# Patient Record
Sex: FEMALE | Race: OTHER | NOT HISPANIC OR LATINO
[De-identification: names, ages, dates, MRNs, and addresses within clinical notes are randomized per-mention and may not be internally consistent; named-entity substitution may affect disease eponyms.]

---

## 2018-11-19 PROBLEM — Z00.00 ENCOUNTER FOR PREVENTIVE HEALTH EXAMINATION: Status: ACTIVE | Noted: 2018-11-19

## 2018-12-11 ENCOUNTER — APPOINTMENT (OUTPATIENT)
Dept: OBGYN | Facility: CLINIC | Age: 43
End: 2018-12-11

## 2018-12-12 ENCOUNTER — APPOINTMENT (OUTPATIENT)
Dept: OBGYN | Facility: CLINIC | Age: 43
End: 2018-12-12

## 2019-02-22 ENCOUNTER — APPOINTMENT (OUTPATIENT)
Dept: OBGYN | Facility: CLINIC | Age: 44
End: 2019-02-22

## 2019-03-14 ENCOUNTER — APPOINTMENT (OUTPATIENT)
Dept: OBGYN | Facility: CLINIC | Age: 44
End: 2019-03-14
Payer: MEDICAID

## 2019-03-14 VITALS
SYSTOLIC BLOOD PRESSURE: 128 MMHG | DIASTOLIC BLOOD PRESSURE: 82 MMHG | WEIGHT: 236 LBS | BODY MASS INDEX: 43.43 KG/M2 | HEIGHT: 62 IN

## 2019-03-14 DIAGNOSIS — Z86.39 PERSONAL HISTORY OF OTHER ENDOCRINE, NUTRITIONAL AND METABOLIC DISEASE: ICD-10-CM

## 2019-03-14 PROCEDURE — 99386 PREV VISIT NEW AGE 40-64: CPT

## 2019-03-14 NOTE — CHIEF COMPLAINT
[Initial Visit] : initial GYN visit [FreeTextEntry1] : A 43 years pt. is present for a breast exam pt. needs script for mammo and pelvic sono. Kelli was in the room during exam.

## 2019-03-14 NOTE — HISTORY OF PRESENT ILLNESS
[Good] : being in good health [Healthy Diet] : a healthy diet [Reproductive Age] : is of reproductive age [Definite:  ___ (Date)] : the last menstrual period was [unfilled] [Spotting Between  Menses] : no spotting between menses [Regular Cycle Intervals] : periods have been regular [Menarche Age: ____] : age at menarche was [unfilled] [Sexually Active] : is sexually active [Male ___] : [unfilled] male

## 2019-03-14 NOTE — PHYSICAL EXAM
[Awake] : awake [Alert] : alert [Soft] : soft [Oriented x3] : oriented to person, place, and time [Normal] : uterus [Labia Majora] : labia major [No Bleeding] : there was no active vaginal bleeding [Pap Obtained] : a Pap smear was performed [Anteversion] : anteverted [Uterine Adnexae] : were not tender and not enlarged [No Tenderness] : no rectal tenderness [RRR, No Murmurs] : RRR, no murmurs [CTAB] : CTAB [Acute Distress] : no acute distress [LAD] : no lymphadenopathy [Thyroid Nodule] : no thyroid nodule [Goiter] : no goiter [Mass] : no breast mass [Nipple Discharge] : no nipple discharge [Axillary LAD] : no axillary lymphadenopathy [Tender] : non tender [Distended] : not distended [H/Smegaly] : no hepatosplenomegaly [Depressed Mood] : not depressed [Flat Affect] : affect not flat [Discharge] : had no discharge [Motion Tenderness] : there was no cervical motion tenderness [Tenderness] : nontender [Enlarged ___ wks] : not enlarged [Adnexa Tenderness] : were not tender [Ovarian Mass (___ Cm)] : there were no adnexal masses [FreeTextEntry7] : exam limited secondary to patient's morbid obesity

## 2019-03-15 LAB — HPV HIGH+LOW RISK DNA PNL CVX: NOT DETECTED

## 2019-03-19 LAB — CYTOLOGY CVX/VAG DOC THIN PREP: NORMAL

## 2019-03-22 ENCOUNTER — APPOINTMENT (OUTPATIENT)
Age: 44
End: 2019-03-22
Payer: MEDICAID

## 2019-03-22 ENCOUNTER — ASOB RESULT (OUTPATIENT)
Age: 44
End: 2019-03-22

## 2019-03-22 PROCEDURE — 76857 US EXAM PELVIC LIMITED: CPT | Mod: 59

## 2019-03-22 PROCEDURE — 76830 TRANSVAGINAL US NON-OB: CPT

## 2019-05-30 ENCOUNTER — APPOINTMENT (OUTPATIENT)
Dept: MAMMOGRAPHY | Facility: CLINIC | Age: 44
End: 2019-05-30

## 2019-05-30 PROCEDURE — 77067 SCR MAMMO BI INCL CAD: CPT

## 2019-05-30 PROCEDURE — 77063 BREAST TOMOSYNTHESIS BI: CPT

## 2019-10-03 ENCOUNTER — APPOINTMENT (OUTPATIENT)
Dept: OBGYN | Facility: CLINIC | Age: 44
End: 2019-10-03
Payer: MEDICAID

## 2019-10-03 VITALS
SYSTOLIC BLOOD PRESSURE: 121 MMHG | HEIGHT: 62 IN | WEIGHT: 207 LBS | BODY MASS INDEX: 38.09 KG/M2 | DIASTOLIC BLOOD PRESSURE: 78 MMHG

## 2019-10-03 DIAGNOSIS — N92.0 EXCESSIVE AND FREQUENT MENSTRUATION WITH REGULAR CYCLE: ICD-10-CM

## 2019-10-03 PROCEDURE — 99213 OFFICE O/P EST LOW 20 MIN: CPT

## 2019-10-03 NOTE — CHIEF COMPLAINT
[Initial Visit] : initial GYN visit [FreeTextEntry1] : A 43 years old pt. is present for a consult on birth control methods. LMP was 10/01/2019. Pt. declined MOA in the room.

## 2019-12-01 ENCOUNTER — INPATIENT (INPATIENT)
Facility: HOSPITAL | Age: 44
LOS: 21 days | Discharge: HOME | DRG: 885 | End: 2019-12-23
Attending: PSYCHIATRY & NEUROLOGY | Admitting: PSYCHIATRY & NEUROLOGY
Payer: COMMERCIAL

## 2019-12-01 ENCOUNTER — EMERGENCY (EMERGENCY)
Facility: HOSPITAL | Age: 44
LOS: 1 days | End: 2019-12-01
Admitting: EMERGENCY MEDICINE
Payer: MEDICAID

## 2019-12-01 PROCEDURE — 99285 EMERGENCY DEPT VISIT HI MDM: CPT

## 2019-12-01 PROCEDURE — 71046 X-RAY EXAM CHEST 2 VIEWS: CPT | Mod: 26

## 2019-12-01 PROCEDURE — 90792 PSYCH DIAG EVAL W/MED SRVCS: CPT | Mod: GT

## 2019-12-01 RX ORDER — FLUPHENAZINE HYDROCHLORIDE 1 MG/1
3.75 TABLET, FILM COATED ORAL AT BEDTIME
Refills: 0 | Status: DISCONTINUED | OUTPATIENT
Start: 2019-12-02 | End: 2019-12-02

## 2019-12-01 RX ORDER — METFORMIN HYDROCHLORIDE 850 MG/1
1000 TABLET ORAL DAILY
Refills: 0 | Status: DISCONTINUED | OUTPATIENT
Start: 2019-12-02 | End: 2019-12-23

## 2019-12-01 RX ORDER — ACETAMINOPHEN 500 MG
325 TABLET ORAL EVERY 4 HOURS
Refills: 0 | Status: DISCONTINUED | OUTPATIENT
Start: 2019-12-02 | End: 2019-12-23

## 2019-12-01 RX ORDER — TRAZODONE HCL 50 MG
50 TABLET ORAL AT BEDTIME
Refills: 0 | Status: DISCONTINUED | OUTPATIENT
Start: 2019-12-02 | End: 2019-12-23

## 2019-12-01 RX ORDER — FLUPHENAZINE HYDROCHLORIDE 1 MG/1
1 TABLET, FILM COATED ORAL EVERY 6 HOURS
Refills: 0 | Status: DISCONTINUED | OUTPATIENT
Start: 2019-12-02 | End: 2019-12-04

## 2019-12-01 RX ORDER — LISINOPRIL 2.5 MG/1
2.5 TABLET ORAL DAILY
Refills: 0 | Status: DISCONTINUED | OUTPATIENT
Start: 2019-12-02 | End: 2019-12-23

## 2019-12-02 VITALS
HEIGHT: 62 IN | RESPIRATION RATE: 18 BRPM | WEIGHT: 199.96 LBS | HEART RATE: 71 BPM | TEMPERATURE: 98 F | SYSTOLIC BLOOD PRESSURE: 125 MMHG | OXYGEN SATURATION: 98 % | DIASTOLIC BLOOD PRESSURE: 82 MMHG

## 2019-12-02 DIAGNOSIS — F25.0 SCHIZOAFFECTIVE DISORDER, BIPOLAR TYPE: ICD-10-CM

## 2019-12-02 DIAGNOSIS — F20.0 PARANOID SCHIZOPHRENIA: ICD-10-CM

## 2019-12-02 LAB
ALBUMIN SERPL ELPH-MCNC: 4.1 G/DL — SIGNIFICANT CHANGE UP (ref 3.3–5)
ALP SERPL-CCNC: 67 U/L — SIGNIFICANT CHANGE UP (ref 40–120)
ALT FLD-CCNC: <5 U/L — LOW (ref 10–45)
ANION GAP SERPL CALC-SCNC: 12 MMOL/L — SIGNIFICANT CHANGE UP (ref 5–17)
AST SERPL-CCNC: 10 U/L — SIGNIFICANT CHANGE UP (ref 10–40)
BILIRUB SERPL-MCNC: <0.2 MG/DL — SIGNIFICANT CHANGE UP (ref 0.2–1.2)
BUN SERPL-MCNC: 13 MG/DL — SIGNIFICANT CHANGE UP (ref 7–23)
CALCIUM SERPL-MCNC: 9.4 MG/DL — SIGNIFICANT CHANGE UP (ref 8.4–10.5)
CHLORIDE SERPL-SCNC: 105 MMOL/L — SIGNIFICANT CHANGE UP (ref 96–108)
CO2 SERPL-SCNC: 26 MMOL/L — SIGNIFICANT CHANGE UP (ref 22–31)
CREAT SERPL-MCNC: 0.65 MG/DL — SIGNIFICANT CHANGE UP (ref 0.5–1.3)
GLUCOSE SERPL-MCNC: 210 MG/DL — HIGH (ref 70–99)
HCG UR QL: NEGATIVE — SIGNIFICANT CHANGE UP
HCT VFR BLD CALC: 40.9 % — SIGNIFICANT CHANGE UP (ref 34.5–45)
HGB BLD-MCNC: 12.7 G/DL — SIGNIFICANT CHANGE UP (ref 11.5–15.5)
MCHC RBC-ENTMCNC: 25.8 PG — LOW (ref 27–34)
MCHC RBC-ENTMCNC: 31.1 GM/DL — LOW (ref 32–36)
MCV RBC AUTO: 83 FL — SIGNIFICANT CHANGE UP (ref 80–100)
NRBC # BLD: 0 /100 WBCS — SIGNIFICANT CHANGE UP (ref 0–0)
PLATELET # BLD AUTO: 275 K/UL — SIGNIFICANT CHANGE UP (ref 150–400)
POTASSIUM SERPL-MCNC: 4.5 MMOL/L — SIGNIFICANT CHANGE UP (ref 3.5–5.3)
POTASSIUM SERPL-SCNC: 4.5 MMOL/L — SIGNIFICANT CHANGE UP (ref 3.5–5.3)
PROT SERPL-MCNC: 7.1 G/DL — SIGNIFICANT CHANGE UP (ref 6–8.3)
RBC # BLD: 4.93 M/UL — SIGNIFICANT CHANGE UP (ref 3.8–5.2)
RBC # FLD: 14.9 % — HIGH (ref 10.3–14.5)
SODIUM SERPL-SCNC: 143 MMOL/L — SIGNIFICANT CHANGE UP (ref 135–145)
WBC # BLD: 13.18 K/UL — HIGH (ref 3.8–10.5)
WBC # FLD AUTO: 13.18 K/UL — HIGH (ref 3.8–10.5)

## 2019-12-02 PROCEDURE — 99233 SBSQ HOSP IP/OBS HIGH 50: CPT

## 2019-12-02 RX ORDER — BENZTROPINE MESYLATE 1 MG
0.5 TABLET ORAL DAILY
Refills: 0 | Status: DISCONTINUED | OUTPATIENT
Start: 2019-12-02 | End: 2019-12-23

## 2019-12-02 RX ORDER — HALOPERIDOL DECANOATE 100 MG/ML
5 INJECTION INTRAMUSCULAR EVERY 6 HOURS
Refills: 0 | Status: DISCONTINUED | OUTPATIENT
Start: 2019-12-02 | End: 2019-12-23

## 2019-12-02 RX ORDER — PANTOPRAZOLE SODIUM 20 MG/1
40 TABLET, DELAYED RELEASE ORAL
Refills: 0 | Status: DISCONTINUED | OUTPATIENT
Start: 2019-12-02 | End: 2019-12-23

## 2019-12-02 RX ORDER — FLUPHENAZINE HYDROCHLORIDE 1 MG/1
5 TABLET, FILM COATED ORAL AT BEDTIME
Refills: 0 | Status: DISCONTINUED | OUTPATIENT
Start: 2019-12-02 | End: 2019-12-06

## 2019-12-02 RX ORDER — POLYETHYLENE GLYCOL 3350 17 G/17G
17 POWDER, FOR SOLUTION ORAL AT BEDTIME
Refills: 0 | Status: DISCONTINUED | OUTPATIENT
Start: 2019-12-02 | End: 2019-12-23

## 2019-12-02 RX ADMIN — Medication 50 MILLIGRAM(S): at 00:20

## 2019-12-02 RX ADMIN — LISINOPRIL 2.5 MILLIGRAM(S): 2.5 TABLET ORAL at 10:51

## 2019-12-02 RX ADMIN — METFORMIN HYDROCHLORIDE 1000 MILLIGRAM(S): 850 TABLET ORAL at 10:50

## 2019-12-02 NOTE — BEHAVIORAL HEALTH ASSESSMENT NOTE - NSBHADMITIPSTRENGTH_PSY_A_CORE
Involved in cultural/spiritual/Mandaeism/community activities/In good physical health/Knowledge of medications/Awareness of substance use issues
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
## 2019-12-02 NOTE — BEHAVIORAL HEALTH ASSESSMENT NOTE - SUICIDE PROTECTIVE FACTORS
Supportive social network of family or friends/Engaged in work or school/Cultural, spiritual and/or moral attitudes against suicide/Identifies reasons for living/Has future plans

## 2019-12-02 NOTE — BEHAVIORAL HEALTH ASSESSMENT NOTE - RISK ASSESSMENT
Low Acute Suicide Risk Static factors  Age, Sex, H/O schizophrenia, family structure, genetic loading  Modifiable factors  T/T non-compliance, acute presentation, mood episode, probable modifiable delusional system  Postive factors  - Supportive family, engaged in work, Druze belief, future oriented, no access to guns, internal resilience,    Presently the patient is at low/minimal risk of suicide and hence would be observed in the hospital for the emergence of any suicidal intent/ideas/thoughts.
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
## 2019-12-02 NOTE — BEHAVIORAL HEALTH ASSESSMENT NOTE - NSBHCHARTREVIEWVS_PSY_A_CORE FT
Vital Signs Last 24 Hrs  T(C): 36.9 (02 Dec 2019 10:15), Max: 36.9 (02 Dec 2019 10:15)  T(F): 98.5 (02 Dec 2019 10:15), Max: 98.5 (02 Dec 2019 10:15)  HR: 80 (02 Dec 2019 10:15) (71 - 80)  BP: 128/86 (02 Dec 2019 10:15) (125/82 - 128/86)  BP(mean): --  RR: 20 (02 Dec 2019 10:15) (18 - 20)  SpO2: 96% (02 Dec 2019 10:15) (96% - 98%)

## 2019-12-02 NOTE — BEHAVIORAL HEALTH ASSESSMENT NOTE - VIOLENCE RISK FACTORS:
Violent ideation/threat/speech/Feeling of being under threat and being unable to control threat/Impulsivity/Irritability

## 2019-12-02 NOTE — BEHAVIORAL HEALTH ASSESSMENT NOTE - SUICIDE RISK FACTORS
Family History of psychiatric diagnoses requiring hospitalization/Insomnia/Current mood episode/Impulsivity

## 2019-12-02 NOTE — BEHAVIORAL HEALTH ASSESSMENT NOTE - CASE SUMMARY
;;12/2:; sitting in bed; irritable; generally oriented; denies need for medications; discounts concerns about her paranoid thinking; mostly protests being in the hosptial;  aware that Prolixin will be started at 5mg; that 2.5mg daily is a homeopathic dose;  will try to obtain more history and anticipate need for TOO if patient continues to demonstrate irritability and demonstrates paranoid thinking.  ...  ;;12/2:; sitting in bed; irritable; generally oriented; denies need for medications; discounts concerns about her paranoid thinking; mostly protests being in the hosptial;  aware that Prolixin will be started at 5mg; that 2.5mg daily is a homeopathic dose;  will try to obtain more history and anticipate need for TOO if patient continues to demonstrate irritability and demonstrates paranoid thinking.
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
## 2019-12-03 LAB — HBA1C BLD-MCNC: 6.3 % — HIGH (ref 4–5.6)

## 2019-12-03 PROCEDURE — 99232 SBSQ HOSP IP/OBS MODERATE 35: CPT

## 2019-12-03 RX ADMIN — METFORMIN HYDROCHLORIDE 1000 MILLIGRAM(S): 850 TABLET ORAL at 10:50

## 2019-12-03 RX ADMIN — FLUPHENAZINE HYDROCHLORIDE 1 MILLIGRAM(S): 1 TABLET, FILM COATED ORAL at 10:50

## 2019-12-03 RX ADMIN — LISINOPRIL 2.5 MILLIGRAM(S): 2.5 TABLET ORAL at 10:50

## 2019-12-03 RX ADMIN — FLUPHENAZINE HYDROCHLORIDE 5 MILLIGRAM(S): 1 TABLET, FILM COATED ORAL at 21:58
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
## 2019-12-03 NOTE — PROGRESS NOTE BEHAVIORAL HEALTH - RISK ASSESSMENT
Static factors include age, sex, H/O schizophrenia, family structure, family history/genetic    Modifiable factors: T/T non-compliance, acute presentation, mood episode, probable modifiable delusional system    Protective factors include supportive family, engaged in work, Jew beliefs, future oriented, no access to guns, internal resilience
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
## 2019-12-03 NOTE — PROGRESS NOTE BEHAVIORAL HEALTH - NSBHCHARTREVIEWVS_PSY_A_CORE FT
Vital Signs Last 24 Hrs  T(C): 36.9 (02 Dec 2019 10:15), Max: 36.9 (02 Dec 2019 10:15)  T(F): 98.5 (02 Dec 2019 10:15), Max: 98.5 (02 Dec 2019 10:15)  HR: 80 (02 Dec 2019 10:15) (80 - 80)  BP: 128/86 (02 Dec 2019 10:15) (128/86 - 128/86)  BP(mean): --  RR: 20 (02 Dec 2019 10:15) (20 - 20)  SpO2: 96% (02 Dec 2019 10:15) (96% - 96%)

## 2019-12-03 NOTE — PROGRESS NOTE BEHAVIORAL HEALTH - RISK ASSESSMENT
Static factors include age, sex, H/O schizophrenia, family structure, family history/genetic    Modifiable factors: T/T non-compliance, acute presentation, mood episode, probable modifiable delusional system    Protective factors include supportive family, engaged in work, Taoism beliefs, future oriented, no access to guns, internal resilience,

## 2019-12-03 NOTE — PROGRESS NOTE BEHAVIORAL HEALTH - NSBHFUPINTERVALHXFT_PSY_A_CORE
Interval history limited due to patient refusal to speak with providers. Patient says she is upset because she told her doctors to call her father several times and has not heard from him yet. States that she tried to call her father yesterday but couldn't because phone did not have a dial tone. Patient was informed that team did speak with her father yesterday and that she must dial "9-1" before phone number to make a call. States she did not have any issues sleeping last night. Otherwise denies suicidal ideation and plan. States she could never kill herself because it is forbidden in Yarsanism and Santeria religions that she practices.    Patient seen later by resident, continues to be hypersexual, with labile mood though is more receptive to discuss initiation of antipsychotic medication. Psychoeducation conducted. She continues to deny SI/HI/plan/intent, and appears to be responding to internal stimuli.
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
## 2019-12-03 NOTE — PROGRESS NOTE BEHAVIORAL HEALTH - NSBHFUPINTERVALHXFT_PSY_A_CORE
Interval history limited due to patient refusal to speak with providers. Patient says she is upset because she told her doctors to call her father several times and has not heard from him yet. States that she tried to call her father yesterday but couldn't because phone did not have a dial tone. Patient was informed that team did speak with her father yesterday and that she must dial "9-1" before phone number to make a call. States she did not have any issues sleeping last night. Otherwise denies suicidal ideation and plan. States she could never kill herself because it is forbidden in Catholic and Santeria religions that she practices.

## 2019-12-04 PROCEDURE — 99232 SBSQ HOSP IP/OBS MODERATE 35: CPT

## 2019-12-04 RX ADMIN — FLUPHENAZINE HYDROCHLORIDE 5 MILLIGRAM(S): 1 TABLET, FILM COATED ORAL at 22:26

## 2019-12-04 RX ADMIN — LISINOPRIL 2.5 MILLIGRAM(S): 2.5 TABLET ORAL at 11:19

## 2019-12-04 RX ADMIN — PANTOPRAZOLE SODIUM 40 MILLIGRAM(S): 20 TABLET, DELAYED RELEASE ORAL at 07:05

## 2019-12-04 RX ADMIN — METFORMIN HYDROCHLORIDE 1000 MILLIGRAM(S): 850 TABLET ORAL at 11:18

## 2019-12-04 NOTE — PROGRESS NOTE BEHAVIORAL HEALTH - RISK ASSESSMENT
Static factors include age, sex, H/O schizophrenia, family structure, family history/genetic    Modifiable factors: T/T non-compliance, acute presentation, mood episode, probable modifiable delusional system    Protective factors include supportive family, engaged in work, Gnosticism beliefs, future oriented, no access to guns, internal resilience, Static factors include age, sex, H/O schizophrenia, family structure, family history/genetic    Modifiable factors: T/T non-compliance, acute presentation, mood episode, probable modifiable delusional system    Protective factors include supportive family, engaged in work, Yarsani beliefs, future oriented, no access to guns, internal resilience,    Presently team is going to focus on the patient's inpatient stay and try to engage the patient in milieu/group therapy, and to upgrade on the medication dosage (Prolixin),

## 2019-12-04 NOTE — PROGRESS NOTE BEHAVIORAL HEALTH - NSBHCHARTREVIEWVS_PSY_A_CORE FT
Vital Signs Last 24 Hrs  T(C): 36.9 (03 Dec 2019 16:20), Max: 36.9 (03 Dec 2019 16:20)  T(F): 98.5 (03 Dec 2019 16:20), Max: 98.5 (03 Dec 2019 16:20)  HR: 100 (03 Dec 2019 16:20) (91 - 100)  BP: 145/83 (03 Dec 2019 16:20) (139/89 - 145/83)  BP(mean): --  RR: 19 (03 Dec 2019 16:20) (19 - 20)  SpO2: 96% (03 Dec 2019 16:20) (96% - 97%) ICU Vital Signs Last 24 Hrs  T(C): 37.2 (04 Dec 2019 09:00), Max: 37.2 (04 Dec 2019 09:00)  T(F): 99 (04 Dec 2019 09:00), Max: 99 (04 Dec 2019 09:00)  HR: 106 (04 Dec 2019 09:00) (100 - 106)  BP: 123/84 (04 Dec 2019 09:00) (123/84 - 145/83)  BP(mean): --  ABP: --  ABP(mean): --  RR: 16 (04 Dec 2019 09:00) (16 - 19)  SpO2: 93% (04 Dec 2019 09:00) (93% - 96%) Vital Signs Last 24 Hrs  T(C): 37.2 (04 Dec 2019 09:00), Max: 37.2 (04 Dec 2019 09:00)  T(F): 99 (04 Dec 2019 09:00), Max: 99 (04 Dec 2019 09:00)  HR: 106 (04 Dec 2019 09:00) (100 - 106)  BP: 123/84 (04 Dec 2019 09:00) (123/84 - 145/83)  BP(mean): --  RR: 16 (04 Dec 2019 09:00) (16 - 19)  SpO2: 93% (04 Dec 2019 09:00) (93% - 96%)
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
## 2019-12-04 NOTE — PROGRESS NOTE BEHAVIORAL HEALTH - NSBHCHARTREVIEWLAB_PSY_A_CORE FT
Hemoglobin A1C, Whole Blood in AM (12.03.19 @ 07:49)    Hemoglobin A1C, Whole Blood: 6.3: Reference Range                 4.0-5.6%       High risk (prediabetic)        5.7-6.4%       Diabetic, diagnostic             >=6.5%       ADA diabetic treatment goal       <7.0%  Reference ranges are based upon the 2010 recommendations of the American  Diabetes Association.  Interpretation may vary for children and  adolescents. %

## 2019-12-04 NOTE — PROGRESS NOTE BEHAVIORAL HEALTH - NSBHFUPINTERVALHXFT_PSY_A_CORE
Pt seen at bedside and discussed with interdisciplinary team. Patient appeared to be internally preoccupied and remains oddly related. Pt agreed last night to take medications and received 1 dose Pt seen at bedside and discussed with interdisciplinary team. Patient appeared to be internally preoccupied and remains oddly related. Pt agreed to take medications and received 5mg Prolixin at 21:00 last night. Yesterday, patient replied "Addie" when asked her full name. Today when asked her full name, patient replied "Katrin Rodríguez". Pt with continued Capgras delusions and refers to her mother as "Phuong". She became agitated when discussing her mother and says she does not know if "Phuong" is still at her house but says "they'll get rid of her". Otherwise, patient denies suicidal ideation, intent, and plan, thoughts of hurting others, hearing voices, and visual hallucinations. Pt is goal-oriented in going home to see her sons and says she will take her medications at home but does not understand why she is being kept on 8 uris. Pt seen at bedside and discussed with interdisciplinary team. Patient appeared to be internally preoccupied and remains oddly related. Pt agreed to take medications and received 5mg Prolixin at 21:00 last night. Yesterday, patient replied "Addie" when asked her full name. Today when asked her full name, patient replied "Katrin Rodríguez". Pt with continued Capgras delusion and refers to her mother as "Phuong". She became agitated when discussing her mother and says she does not know if "Phuong" is still at her house but says "they'll get rid of her". Otherwise, patient denies suicidal ideation, intent, and plan, thoughts of hurting others, hearing voices, and visual hallucinations. Pt is goal-oriented in going home to see her sons and says she will take her medications at home but does not understand why she is being kept on 8 uris.

## 2019-12-05 PROCEDURE — 99232 SBSQ HOSP IP/OBS MODERATE 35: CPT

## 2019-12-05 RX ADMIN — Medication 50 MILLIGRAM(S): at 02:45

## 2019-12-05 RX ADMIN — FLUPHENAZINE HYDROCHLORIDE 5 MILLIGRAM(S): 1 TABLET, FILM COATED ORAL at 21:40

## 2019-12-05 RX ADMIN — PANTOPRAZOLE SODIUM 40 MILLIGRAM(S): 20 TABLET, DELAYED RELEASE ORAL at 06:29

## 2019-12-05 RX ADMIN — LISINOPRIL 2.5 MILLIGRAM(S): 2.5 TABLET ORAL at 09:57

## 2019-12-05 RX ADMIN — METFORMIN HYDROCHLORIDE 1000 MILLIGRAM(S): 850 TABLET ORAL at 09:57
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
## 2019-12-05 NOTE — PROGRESS NOTE BEHAVIORAL HEALTH - NSBHCHARTREVIEWVS_PSY_A_CORE FT
Vital Signs Last 24 Hrs  T(C): 37.1 (04 Dec 2019 17:00), Max: 37.2 (04 Dec 2019 09:00)  T(F): 98.7 (04 Dec 2019 17:00), Max: 99 (04 Dec 2019 09:00)  HR: 100 (04 Dec 2019 17:00) (100 - 106)  BP: 112/80 (04 Dec 2019 17:00) (112/80 - 123/84)  BP(mean): --  RR: 20 (04 Dec 2019 17:00) (16 - 20)  SpO2: 96% (04 Dec 2019 17:00) (93% - 96%) Vital Signs Last 24 Hrs  T(C): 36.9 (05 Dec 2019 08:00), Max: 37.1 (04 Dec 2019 17:00)  T(F): 98.5 (05 Dec 2019 08:00), Max: 98.7 (04 Dec 2019 17:00)  HR: 105 (05 Dec 2019 08:00) (100 - 105)  BP: 115/77 (05 Dec 2019 08:00) (112/80 - 115/77)  BP(mean): --  RR: 18 (05 Dec 2019 08:00) (18 - 20)  SpO2: 97% (05 Dec 2019 08:00) (96% - 97%)

## 2019-12-05 NOTE — PROGRESS NOTE BEHAVIORAL HEALTH - RISK ASSESSMENT
Static factors include age, sex, H/O schizophrenia, family structure, family history/genetic    Modifiable factors: T/T non-compliance, acute presentation, mood episode, probable modifiable delusional system    Protective factors include supportive family, engaged in work, Evangelical beliefs, future oriented, no access to guns, internal resilience,    Presently team is going to focus on the patient's inpatient stay and try to engage the patient in milieu/group therapy, and to upgrade on the medication dosage (Prolixin),

## 2019-12-05 NOTE — PROGRESS NOTE BEHAVIORAL HEALTH - NSBHFUPINTERVALHXFT_PSY_A_CORE
Pt seen at bedside and discussed with interdisciplinary team. Patient continues to appear internally preoccupied but with improved relatedness. She displays a cheerful affect and is markedly less irritable than previously noted. She continues to say that she feels "fine" and denies side effects of medication. Per nursing, patient did not go to sleep last night. When asked, patient states that she sleeps just fine and that she used to work 6a-10p "on the outside" and did not get much sleep. Otherwise, patient denies suicidal ideation, intent, and plan, thoughts of hurting others, hearing voices, and visual hallucinations. Pt is goal-oriented in going home to see her sons and says she is looking forward to her daughter visiting.

## 2019-12-06 PROCEDURE — 99232 SBSQ HOSP IP/OBS MODERATE 35: CPT

## 2019-12-06 RX ORDER — FLUPHENAZINE HYDROCHLORIDE 1 MG/1
10 TABLET, FILM COATED ORAL AT BEDTIME
Refills: 0 | Status: DISCONTINUED | OUTPATIENT
Start: 2019-12-06 | End: 2019-12-09

## 2019-12-06 RX ADMIN — PANTOPRAZOLE SODIUM 40 MILLIGRAM(S): 20 TABLET, DELAYED RELEASE ORAL at 07:00

## 2019-12-06 RX ADMIN — LISINOPRIL 2.5 MILLIGRAM(S): 2.5 TABLET ORAL at 09:55

## 2019-12-06 RX ADMIN — METFORMIN HYDROCHLORIDE 1000 MILLIGRAM(S): 850 TABLET ORAL at 09:55

## 2019-12-06 RX ADMIN — FLUPHENAZINE HYDROCHLORIDE 10 MILLIGRAM(S): 1 TABLET, FILM COATED ORAL at 22:24

## 2019-12-06 NOTE — PROGRESS NOTE BEHAVIORAL HEALTH - RISK ASSESSMENT
Static factors include age, sex, H/O schizophrenia, family structure, family history/genetic    Modifiable factors: T/T non-compliance, acute presentation, mood episode, probable modifiable delusional system    Protective factors include supportive family, engaged in work, Mandaen beliefs, future oriented, no access to guns, internal resilience,    Presently team is going to focus on the patient's inpatient stay and try to engage the patient in milieu/group therapy, and to upgrade on the medication dosage (Prolixin),

## 2019-12-06 NOTE — PROGRESS NOTE BEHAVIORAL HEALTH - NSBHCHARTREVIEWVS_PSY_A_CORE FT
Vital Signs Last 24 Hrs  T(C): 37 (05 Dec 2019 16:22), Max: 37 (05 Dec 2019 16:22)  T(F): 98.6 (05 Dec 2019 16:22), Max: 98.6 (05 Dec 2019 16:22)  HR: 93 (05 Dec 2019 16:22) (93 - 93)  BP: 126/81 (05 Dec 2019 16:22) (126/81 - 126/81)  BP(mean): --  RR: 18 (05 Dec 2019 16:22) (18 - 18)  SpO2: 97% (05 Dec 2019 16:22) (97% - 97%) Vital Signs Last 24 Hrs  T(C): 36.7 (06 Dec 2019 08:30), Max: 37 (05 Dec 2019 16:22)  T(F): 98.1 (06 Dec 2019 08:30), Max: 98.6 (05 Dec 2019 16:22)  HR: 91 (06 Dec 2019 08:30) (91 - 93)  BP: 116/80 (06 Dec 2019 08:30) (116/80 - 126/81)  BP(mean): --  RR: 16 (06 Dec 2019 08:30) (16 - 18)  SpO2: 96% (06 Dec 2019 08:30) (96% - 97%)

## 2019-12-06 NOTE — PROGRESS NOTE BEHAVIORAL HEALTH - NSBHFUPINTERVALHXFT_PSY_A_CORE
The patient was seen by the pharmacy window near the nursing station this morning, she was spontaneous in greeting the writer, stated sleeping well, and improvement in mood while being in the unit, she mentioned that she did call his family yesterday and spoke with his father, however avoided the question of speaking with mother " she was not around", today also tried to minimize the presence of her delusional belief when asked .About her medications, displayed a degree of nonchalance (' I don't mind anything, whatever you guys give")  the patient usually is engaged in a conversation but there is a precoce feeling of an underlying disorder while speaking with her.

## 2019-12-07 LAB
CHOLEST SERPL-MCNC: 178 MG/DL — SIGNIFICANT CHANGE UP (ref 10–199)
HDLC SERPL-MCNC: 48 MG/DL — LOW
LIPID PNL WITH DIRECT LDL SERPL: 107 MG/DL — HIGH
TOTAL CHOLESTEROL/HDL RATIO MEASUREMENT: 3.7 RATIO — SIGNIFICANT CHANGE UP (ref 3.3–7.1)
TRIGL SERPL-MCNC: 114 MG/DL — SIGNIFICANT CHANGE UP (ref 10–149)

## 2019-12-07 RX ADMIN — LISINOPRIL 2.5 MILLIGRAM(S): 2.5 TABLET ORAL at 10:14

## 2019-12-07 RX ADMIN — METFORMIN HYDROCHLORIDE 1000 MILLIGRAM(S): 850 TABLET ORAL at 10:14

## 2019-12-07 RX ADMIN — PANTOPRAZOLE SODIUM 40 MILLIGRAM(S): 20 TABLET, DELAYED RELEASE ORAL at 07:17

## 2019-12-07 RX ADMIN — FLUPHENAZINE HYDROCHLORIDE 10 MILLIGRAM(S): 1 TABLET, FILM COATED ORAL at 21:14

## 2019-12-08 RX ADMIN — LISINOPRIL 2.5 MILLIGRAM(S): 2.5 TABLET ORAL at 10:18

## 2019-12-08 RX ADMIN — FLUPHENAZINE HYDROCHLORIDE 10 MILLIGRAM(S): 1 TABLET, FILM COATED ORAL at 21:06

## 2019-12-08 RX ADMIN — Medication 50 MILLIGRAM(S): at 21:06

## 2019-12-08 RX ADMIN — METFORMIN HYDROCHLORIDE 1000 MILLIGRAM(S): 850 TABLET ORAL at 10:18

## 2019-12-08 RX ADMIN — PANTOPRAZOLE SODIUM 40 MILLIGRAM(S): 20 TABLET, DELAYED RELEASE ORAL at 07:26

## 2019-12-09 PROCEDURE — 99232 SBSQ HOSP IP/OBS MODERATE 35: CPT

## 2019-12-09 RX ORDER — FLUPHENAZINE HYDROCHLORIDE 1 MG/1
15 TABLET, FILM COATED ORAL AT BEDTIME
Refills: 0 | Status: DISCONTINUED | OUTPATIENT
Start: 2019-12-09 | End: 2019-12-11

## 2019-12-09 RX ADMIN — PANTOPRAZOLE SODIUM 40 MILLIGRAM(S): 20 TABLET, DELAYED RELEASE ORAL at 06:38

## 2019-12-09 RX ADMIN — LISINOPRIL 2.5 MILLIGRAM(S): 2.5 TABLET ORAL at 10:40

## 2019-12-09 RX ADMIN — Medication 50 MILLIGRAM(S): at 21:30

## 2019-12-09 RX ADMIN — METFORMIN HYDROCHLORIDE 1000 MILLIGRAM(S): 850 TABLET ORAL at 10:40

## 2019-12-09 RX ADMIN — FLUPHENAZINE HYDROCHLORIDE 15 MILLIGRAM(S): 1 TABLET, FILM COATED ORAL at 21:30
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
## 2019-12-09 NOTE — PROGRESS NOTE BEHAVIORAL HEALTH - NSBHFUPINTERVALHXFT_PSY_A_CORE
The patient was seen at bedside this morning. Case discussed with inter she was spontaneous in greeting the writer, stated sleeping well, and improvement in mood while being in the unit, she mentioned that she did call his family yesterday and spoke with his father, however avoided the question of speaking with mother " she was not around", today also tried to minimize the presence of her delusional belief when asked .About her medications, displayed a degree of nonchalance (' I don't mind anything, whatever you guys give")  the patient usually is engaged in a conversation but there is a precoce feeling of an underlying disorder while speaking with her. The patient was seen at bedside this morning. Case discussed with interdisciplinary team. She was spontaneous in greeting the writer, stated sleeping well, and improvement in mood while being in the unit. Patient future-oriented and says she is looking forward to going home so that she can study for GED. States that her "mother, father, brother, and two daughters" are waiting at home for her. About her medications, displayed a degree of nonchalance (' I don't mind anything, whatever you guys give")  the patient usually is engaged in a conversation but there is a precoce feeling of an underlying disorder while speaking with her. The patient was seen at bedside this morning. Case discussed with interdisciplinary team. She was spontaneous in greeting the writer, stated sleeping well, and improvement in mood while being in the unit. Patient future-oriented and says she is looking forward to going home so that she can study for GED. States that her "mother, father, brother, and two daughters" are waiting at home for her. About her medications, displayed a degree of nonchalance (' I don't mind anything, whatever you guys give"). Per nursing, patient remains internally preoccupied, often laughing to herself. Pt states that her sleep is improved and that she is able to sleep through the night with no issues which is an improvement from last week. Otherwise, patient denies thoughts of harming self/others, visual and auditory hallucinations.

## 2019-12-09 NOTE — PROGRESS NOTE BEHAVIORAL HEALTH - RISK ASSESSMENT
Static factors include age, sex, H/O schizophrenia, family structure, family history/genetic    Modifiable factors: T/T non-compliance, acute presentation, mood episode, probable modifiable delusional system    Protective factors include supportive family, engaged in work, Nondenominational beliefs, future oriented, no access to guns, internal resilience,    Presently team is going to focus on the patient's inpatient stay and try to engage the patient in milieu/group therapy, and to upgrade on the medication dosage (Prolixin),

## 2019-12-09 NOTE — PROGRESS NOTE BEHAVIORAL HEALTH - NSBHCHARTREVIEWVS_PSY_A_CORE FT
Vital Signs Last 24 Hrs  T(C): 36.8 (08 Dec 2019 17:00), Max: 36.9 (08 Dec 2019 10:00)  T(F): 98.2 (08 Dec 2019 17:00), Max: 98.4 (08 Dec 2019 10:00)  HR: 92 (08 Dec 2019 17:00) (88 - 92)  BP: 95/64 (08 Dec 2019 17:00) (95/64 - 118/71)  BP(mean): --  RR: 16 (08 Dec 2019 17:00) (16 - 20)  SpO2: 99% (08 Dec 2019 17:00) (96% - 99%) Vital Signs Last 24 Hrs  T(C): 36.6 (09 Dec 2019 09:00), Max: 36.8 (08 Dec 2019 17:00)  T(F): 97.8 (09 Dec 2019 09:00), Max: 98.2 (08 Dec 2019 17:00)  HR: 79 (09 Dec 2019 09:00) (79 - 92)  BP: 105/76 (09 Dec 2019 09:00) (95/64 - 105/76)  BP(mean): --  RR: 16 (09 Dec 2019 09:00) (16 - 16)  SpO2: 97% (09 Dec 2019 09:00) (97% - 99%)

## 2019-12-09 NOTE — PROGRESS NOTE BEHAVIORAL HEALTH - NS ED BHA SUICIDALITY PRESENT CURRENT INTENT DETAILS COLLATERAL FT
Patient has never been suicidal as per brother,

## 2019-12-09 NOTE — PROGRESS NOTE BEHAVIORAL HEALTH - NSBHCHARTREVIEWLAB_PSY_A_CORE FT
Hemoglobin A1C, Whole Blood in AM (12.03.19 @ 07:49)    Hemoglobin A1C, Whole Blood: 6.3: Reference Range                 4.0-5.6%       High risk (prediabetic)        5.7-6.4%       Diabetic, diagnostic             >=6.5%       ADA diabetic treatment goal       <7.0%  Reference ranges are based upon the 2010 recommendations of the American  Diabetes Association.  Interpretation may vary for children and  adolescents. % Lipid Profile in AM (12.07.19 @ 08:41)    Total Cholesterol/HDL Ratio Measurement: 3.7 RATIO    Cholesterol, Serum: 178 mg/dL    Triglycerides, Serum: 114 mg/dL    HDL Cholesterol, Serum: 48: HDL Levels >/= 60 mg/dL are considered beneficial and a "negative" risk  factor.  Effective 08/15/2018: New reference range and interpretive comment. mg/dL    Direct LDL: 107: LDL Cholesterol --- Interpretive Comment (for adults 18 and over)  Below 70                  Ideal for people at very high risk of heart  disease  Below 100                Ideal for people at risk of heart disease  100 - 129                  [place]  130 - 159                  Borderline high  160 - 189                  High  190 and Above        Very high mg/dL    POCT  Blood Glucose (12.05.19 @ 04:14)    POCT Blood Glucose.: 104 mg/dL

## 2019-12-10 PROCEDURE — 99232 SBSQ HOSP IP/OBS MODERATE 35: CPT

## 2019-12-10 RX ADMIN — FLUPHENAZINE HYDROCHLORIDE 15 MILLIGRAM(S): 1 TABLET, FILM COATED ORAL at 21:35

## 2019-12-10 RX ADMIN — PANTOPRAZOLE SODIUM 40 MILLIGRAM(S): 20 TABLET, DELAYED RELEASE ORAL at 07:09

## 2019-12-10 RX ADMIN — METFORMIN HYDROCHLORIDE 1000 MILLIGRAM(S): 850 TABLET ORAL at 09:41

## 2019-12-10 RX ADMIN — Medication 50 MILLIGRAM(S): at 21:35

## 2019-12-10 RX ADMIN — LISINOPRIL 2.5 MILLIGRAM(S): 2.5 TABLET ORAL at 09:41

## 2019-12-10 NOTE — PROGRESS NOTE BEHAVIORAL HEALTH - NSBHFUPINTERVALCCFT_PSY_A_CORE
" I feel ok, the medicine is working, I slept well" "I feel ok, the medicine is working, I slept well"
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
## 2019-12-10 NOTE — PROGRESS NOTE BEHAVIORAL HEALTH - NSBHCHARTREVIEWLAB_PSY_A_CORE FT
Lipid Profile in AM (12.07.19 @ 08:41)    Total Cholesterol/HDL Ratio Measurement: 3.7 RATIO    Cholesterol, Serum: 178 mg/dL    Triglycerides, Serum: 114 mg/dL    HDL Cholesterol, Serum: 48: HDL Levels >/= 60 mg/dL are considered beneficial and a "negative" risk  factor.  Effective 08/15/2018: New reference range and interpretive comment. mg/dL    Direct LDL: 107  Hemoglobin A1C, Whole Blood in AM (12.03.19 @ 07:49)    Hemoglobin A1C, Whole Blood: 6.3:   Pregnancy Profile, Urine (12.02.19 @ 19:11)    Pregnancy Profile, Urine: Negative

## 2019-12-10 NOTE — PROGRESS NOTE BEHAVIORAL HEALTH - NSBHFUPINTERVALHXFT_PSY_A_CORE
The patient was seen this afternoon, after lunch hours, the patient states that the Prolixin in working and endorsed acceptance on the option of going up on it. However the patient was strictly reserved about the option of AVILEZ. The patient imparts this as because, of prior experience, " they put me on Prolixin before, the injectable form, and I wasn't able to take it". She also denied any of the paranoid thinking which she was manifesting during admission. She also refused to comment on the black magic stuff which she was talking about initially, Presently team would continue going up on the Prolixin dosage and would try to set up a family meeting later in this week, or maybe early next week before deciding on a treatment plan. The patient does appear much calmer and composed to the interview, and was able to carry on a meaningful conversation with the writer. The patient was seen this afternoon, after lunch hours, the patient states that the Prolixin in working and endorsed acceptance on the option of going up on it. However the patient was strictly reserved about the option of AVILEZ. The patient imparts this as because, of prior experience, "they put me on Prolixin before, the injectable form, and I wasn't able to take it." She also denied any of the paranoid thinking which she was manifesting during admission. She also refused to comment on the black magic stuff which she was talking about initially. The patient appears much calmer and composed to the interview, and was able to carry on a meaningful conversation with the writer. She denies SI/HI/AH/VH.
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
## 2019-12-10 NOTE — PROGRESS NOTE BEHAVIORAL HEALTH - NSBHCHARTREVIEWVS_PSY_A_CORE FT
Vital Signs Last 24 Hrs  T(C): 36.8 (10 Dec 2019 10:23), Max: 36.9 (09 Dec 2019 16:37)  T(F): 98.3 (10 Dec 2019 10:23), Max: 98.4 (09 Dec 2019 16:37)  HR: 86 (10 Dec 2019 10:23) (86 - 92)  BP: 109/64 (10 Dec 2019 10:23) (109/64 - 113/78)  BP(mean): --  RR: 20 (10 Dec 2019 10:23) (18 - 20)  SpO2: 97% (10 Dec 2019 10:23) (95% - 97%)

## 2019-12-11 LAB — HIV 1+2 AB+HIV1 P24 AG SERPL QL IA: SIGNIFICANT CHANGE UP

## 2019-12-11 PROCEDURE — 99232 SBSQ HOSP IP/OBS MODERATE 35: CPT

## 2019-12-11 RX ORDER — FLUPHENAZINE HYDROCHLORIDE 1 MG/1
20 TABLET, FILM COATED ORAL AT BEDTIME
Refills: 0 | Status: DISCONTINUED | OUTPATIENT
Start: 2019-12-11 | End: 2019-12-13

## 2019-12-11 RX ADMIN — PANTOPRAZOLE SODIUM 40 MILLIGRAM(S): 20 TABLET, DELAYED RELEASE ORAL at 07:25

## 2019-12-11 RX ADMIN — METFORMIN HYDROCHLORIDE 1000 MILLIGRAM(S): 850 TABLET ORAL at 10:45

## 2019-12-11 RX ADMIN — LISINOPRIL 2.5 MILLIGRAM(S): 2.5 TABLET ORAL at 10:45

## 2019-12-11 RX ADMIN — FLUPHENAZINE HYDROCHLORIDE 20 MILLIGRAM(S): 1 TABLET, FILM COATED ORAL at 21:53

## 2019-12-11 NOTE — PROGRESS NOTE BEHAVIORAL HEALTH - NSBHFUPINTERVALHXFT_PSY_A_CORE
The patient was seen this morning by this writer. Case discussed with interdisciplinary team. Patient remains reserved about the option of AVILEZ but amenable to dosage increase. Patient previously stated, "they put me on Prolixin before, the injectable form, and I wasn't able to take it." She also denied any of the paranoid thinking which she was manifesting during admission. She also refused to comment on the black magic which she was talking about initially. The patient appears much calmer and composed to the interview, and was able to carry on a meaningful conversation with the writer. She says that she is excited for planned family meeting with mother and brother but is unsure if father can make the meeting because he recently underwent surgery. Patient was notified of negative pregnancy test and requests STI testing because she was sexually active before admission. She denies SI/HI/AH/VH. The patient was seen this morning by this writer. Case discussed with interdisciplinary team. Patient remains reserved about the option of AVILEZ but amenable to dosage increase. Patient previously stated, "they put me on Prolixin before, the injectable form, and I wasn't able to take it." She also denied any of the paranoid thinking which she was manifesting during admission. She also refused to comment on the black magic which she was talking about initially. The patient appears much calmer and composed to the interview, and was able to carry on a meaningful conversation with the writer. She says that she is excited for planned family meeting with mother and brother but is unsure if father can make the meeting because he recently underwent surgery. Patient was notified of negative pregnancy test. Pt requested STI testing including HIV because she was sexually active before admission. She denies SI/HI/AH/VH. The patient was seen this morning by this writer. Case discussed with interdisciplinary team. Patient remains reserved about the option of AVILEZ but amenable to dosage increase. Patient previously stated, "they put me on Prolixin before, the injectable form, and I wasn't able to take it." She also denied any of the paranoid thinking which she was manifesting during admission. She also refused to comment on the black magic which she was talking about initially. The patient appears much calmer and composed to the interview, and was able to carry on a meaningful conversation with the writer. She says that she is excited for planned family meeting with mother and brother but is unsure if father can make the meeting because he recently underwent surgery. Patient was notified of negative pregnancy test. Pt requested STI testing including HIV because she was sexually active before admission. She denies SI/HI/AH/VH.    Resident addendum  The patient endorsed improvement in mood status , mentioned going to the groups, enjoys in particular the music group, later in the day,SW set  up a family meeting with mother present on Friday (12/13/2019) , and the decision was relayed to the patient , to which she agreed, the patient is presently showing much improvement than she earlier presented, also seems to have gained insight into her condition.

## 2019-12-11 NOTE — PROGRESS NOTE BEHAVIORAL HEALTH - NSBHCHARTREVIEWVS_PSY_A_CORE FT
Vital Signs Last 24 Hrs  T(C): 36.9 (10 Dec 2019 16:11), Max: 36.9 (10 Dec 2019 16:11)  T(F): 98.4 (10 Dec 2019 16:11), Max: 98.4 (10 Dec 2019 16:11)  HR: 95 (10 Dec 2019 16:11) (86 - 95)  BP: 116/76 (10 Dec 2019 16:11) (109/64 - 116/76)  BP(mean): --  RR: 19 (10 Dec 2019 16:11) (19 - 20)  SpO2: 96% (10 Dec 2019 16:11) (96% - 97%) Vital Signs Last 24 Hrs  T(C): 36.8 (11 Dec 2019 09:42), Max: 36.9 (10 Dec 2019 16:11)  T(F): 98.2 (11 Dec 2019 09:42), Max: 98.4 (10 Dec 2019 16:11)  HR: 85 (11 Dec 2019 09:42) (85 - 95)  BP: 112/76 (11 Dec 2019 09:42) (112/76 - 116/76)  BP(mean): --  RR: 20 (11 Dec 2019 09:42) (19 - 20)  SpO2: 95% (11 Dec 2019 09:42) (95% - 96%)
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
## 2019-12-12 LAB — T PALLIDUM AB TITR SER: NEGATIVE — SIGNIFICANT CHANGE UP

## 2019-12-12 PROCEDURE — 99232 SBSQ HOSP IP/OBS MODERATE 35: CPT

## 2019-12-12 RX ADMIN — PANTOPRAZOLE SODIUM 40 MILLIGRAM(S): 20 TABLET, DELAYED RELEASE ORAL at 07:05

## 2019-12-12 RX ADMIN — LISINOPRIL 2.5 MILLIGRAM(S): 2.5 TABLET ORAL at 10:18

## 2019-12-12 RX ADMIN — METFORMIN HYDROCHLORIDE 1000 MILLIGRAM(S): 850 TABLET ORAL at 10:18

## 2019-12-12 RX ADMIN — Medication 50 MILLIGRAM(S): at 21:21

## 2019-12-12 RX ADMIN — FLUPHENAZINE HYDROCHLORIDE 20 MILLIGRAM(S): 1 TABLET, FILM COATED ORAL at 21:21
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
## 2019-12-12 NOTE — PROGRESS NOTE BEHAVIORAL HEALTH - NSBHFUPINTERVALHXFT_PSY_A_CORE
The patient was seen this morning by this writer. Case discussed with interdisciplinary team. Patient remains reserved about the option of AVILEZ but amenable to dosage increase. Patient previously stated, "they put me on Prolixin before, the injectable form, and I wasn't able to take it." She also denied any of the paranoid thinking which she was manifesting during admission. Patient asked, "my mom is coming today, right?". When asked about black magic and her mother Phuong haunting her house which she was talking about initially, she states "We're good now. She just has an attitude." Pt stated that she is looking forward to her mom visiting. The patient appears much calmer and composed to the interview, and was able to carry on a meaningful conversation with the writer. Pt remains goal-oriented towards discharge, asking "When am I getting out of here?". Yesterday, pt requested STI testing including HIV because she was sexually active before admission. She denies SI/HI/AH/VH.    Resident addendum  The patient endorsed improvement in mood status , mentioned going to the groups, enjoys in particular the music group, later in the day,SW set  up a family meeting with mother present on Friday (12/13/2019) , and the decision was relayed to the patient , to which she agreed, the patient is presently showing much improvement than she earlier presented, also seems to have gained insight into her condition. The patient was seen this morning by this writer. Case discussed with interdisciplinary team. Patient remains reserved about the option of AVILEZ but amenable to dosage increase. Patient previously stated, "they put me on Prolixin before, the injectable form, and I wasn't able to take it." She also denied any of the paranoid thinking which she was manifesting during admission. Patient asked, "my mom is coming today, right?". When asked about black magic and her mother Phuong haunting her house which she was talking about initially, she states "We're good now. She just has an attitude." Pt stated that she is looking forward to her mom visiting. The patient appears much calmer and composed to the interview, and was able to carry on a meaningful conversation with the writer. Pt remains goal-oriented towards discharge, asking "When am I getting out of here?". Yesterday, pt requested STI testing including HIV because she was sexually active before admission. Today, patient informed that HIV and Treponema tests are negative. She denies SI/HI/AH/VH.    Resident addendum  The patient endorsed improvement in mood status , mentioned going to the groups, enjoys in particular the music group, later in the day,SW set  up a family meeting with mother present on Friday (12/13/2019) , and the decision was relayed to the patient , to which she agreed, the patient is presently showing much improvement than she earlier presented, also seems to have gained insight into her condition.

## 2019-12-12 NOTE — PROGRESS NOTE BEHAVIORAL HEALTH - NSBHCHARTREVIEWLAB_PSY_A_CORE FT
Lipid Profile in AM (12.07.19 @ 08:41)    Total Cholesterol/HDL Ratio Measurement: 3.7 RATIO    Cholesterol, Serum: 178 mg/dL    Triglycerides, Serum: 114 mg/dL    HDL Cholesterol, Serum: 48: HDL Levels >/= 60 mg/dL are considered beneficial and a "negative" risk  factor.  Effective 08/15/2018: New reference range and interpretive comment. mg/dL    Direct LDL: 107  Hemoglobin A1C, Whole Blood in AM (12.03.19 @ 07:49)    Hemoglobin A1C, Whole Blood: 6.3:   Pregnancy Profile, Urine (12.02.19 @ 19:11)    Pregnancy Profile, Urine: Negative Syphilis Screen (12.11.19 @ 22:59)    Treponema Pallidum Antibody Interpretation: Negative    HIV-1/2 Antigen/Antibody Screen by [facility] (12.11.19 @ 16:50)    HIV-1/2 Combo Result: Nonreact      Lipid Profile in AM (12.07.19 @ 08:41)    Total Cholesterol/HDL Ratio Measurement: 3.7 RATIO    Cholesterol, Serum: 178 mg/dL    Triglycerides, Serum: 114 mg/dL    HDL Cholesterol, Serum: 48: HDL Levels >/= 60 mg/dL are considered beneficial and a "negative" risk  factor.  Effective 08/15/2018: New reference range and interpretive comment. mg/dL    Direct LDL: 107  Hemoglobin A1C, Whole Blood in AM (12.03.19 @ 07:49)    Hemoglobin A1C, Whole Blood: 6.3:   Pregnancy Profile, Urine (12.02.19 @ 19:11)    Pregnancy Profile, Urine: Negative
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
## 2019-12-12 NOTE — PROGRESS NOTE BEHAVIORAL HEALTH - NSBHCHARTREVIEWVS_PSY_A_CORE FT
Vital Signs Last 24 Hrs  T(C): 36.8 (11 Dec 2019 16:37), Max: 36.8 (11 Dec 2019 09:42)  T(F): 98.3 (11 Dec 2019 16:37), Max: 98.3 (11 Dec 2019 16:37)  HR: 87 (11 Dec 2019 16:37) (85 - 87)  BP: 134/76 (11 Dec 2019 16:37) (112/76 - 134/76)  BP(mean): --  RR: 18 (11 Dec 2019 16:37) (18 - 20)  SpO2: 97% (11 Dec 2019 16:37) (95% - 97%) Vital Signs Last 24 Hrs  T(C): 37.1 (12 Dec 2019 10:00), Max: 37.1 (12 Dec 2019 10:00)  T(F): 98.8 (12 Dec 2019 10:00), Max: 98.8 (12 Dec 2019 10:00)  HR: 82 (12 Dec 2019 10:00) (82 - 87)  BP: 109/67 (12 Dec 2019 10:00) (109/67 - 134/76)  BP(mean): --  RR: 20 (12 Dec 2019 10:00) (18 - 20)  SpO2: 96% (12 Dec 2019 10:00) (96% - 97%)

## 2019-12-13 PROCEDURE — 99232 SBSQ HOSP IP/OBS MODERATE 35: CPT

## 2019-12-13 RX ORDER — FLUPHENAZINE HYDROCHLORIDE 1 MG/1
25 TABLET, FILM COATED ORAL AT BEDTIME
Refills: 0 | Status: DISCONTINUED | OUTPATIENT
Start: 2019-12-13 | End: 2019-12-16

## 2019-12-13 RX ADMIN — Medication 50 MILLIGRAM(S): at 21:28

## 2019-12-13 RX ADMIN — METFORMIN HYDROCHLORIDE 1000 MILLIGRAM(S): 850 TABLET ORAL at 10:33

## 2019-12-13 RX ADMIN — LISINOPRIL 2.5 MILLIGRAM(S): 2.5 TABLET ORAL at 10:34

## 2019-12-13 RX ADMIN — PANTOPRAZOLE SODIUM 40 MILLIGRAM(S): 20 TABLET, DELAYED RELEASE ORAL at 10:33

## 2019-12-13 RX ADMIN — FLUPHENAZINE HYDROCHLORIDE 25 MILLIGRAM(S): 1 TABLET, FILM COATED ORAL at 21:28

## 2019-12-13 NOTE — PROGRESS NOTE BEHAVIORAL HEALTH - NSBHFUPINTERVALHXFT_PSY_A_CORE
The patient was seen this morning by this writer. Case discussed with interdisciplinary team. Patient remains opposed to the option of AVILEZ saying "no I don't want any injection." She denied any of the paranoid thinking which she was manifesting during admission and has a family meeting with her mother and brother this afternoon. Pt asked, "What time is my meeting? My mom told me that she was visiting today." Pt remains goal-oriented towards discharge and states, "I have my test I have to take for my GED."  Per nursing, patient is visible on the unit, social, and better related with "brighter" less constricted affect. She denies SI/HI/AH/VH. Pt requested a physical copy of STI screening results. Denies any side effects from Prolixin. The patient was seen this morning by this writer. Case discussed with interdisciplinary team. Patient remains opposed to the option of AVILEZ saying "no I don't want any injection." She denied any of the paranoid thinking which she was manifesting during admission and has a family meeting with her mother and brother this afternoon. Pt asked, "What time is my meeting? My mom told me that she was visiting today." Pt remains goal-oriented towards discharge and states, "I have my test I have to take for my GED."  Per nursing, patient is visible on the unit, social, and better related with "brighter" less constricted affect. She denies SI/HI/AH/VH. Pt requested a physical copy of STI screening results. Denies any side effects from Prolixin.    The patient had the team meeting in the afternoon, some degree of interpersonal conflict was appreciable amongst the family ( mother and brother) and the patient, there seems to be some degree of strictness directed towards the patient, however the team would continue to manage the patient in the best way possible.

## 2019-12-13 NOTE — PROGRESS NOTE BEHAVIORAL HEALTH - NSBHCHARTREVIEWLAB_PSY_A_CORE FT
Syphilis Screen (12.11.19 @ 22:59)    Treponema Pallidum Antibody Interpretation: Negative    HIV-1/2 Antigen/Antibody Screen by [facility] (12.11.19 @ 16:50)    HIV-1/2 Combo Result: Nonreact      Lipid Profile in AM (12.07.19 @ 08:41)    Total Cholesterol/HDL Ratio Measurement: 3.7 RATIO    Cholesterol, Serum: 178 mg/dL    Triglycerides, Serum: 114 mg/dL    HDL Cholesterol, Serum: 48: HDL Levels >/= 60 mg/dL are considered beneficial and a "negative" risk  factor.  Effective 08/15/2018: New reference range and interpretive comment. mg/dL    Direct LDL: 107  Hemoglobin A1C, Whole Blood in AM (12.03.19 @ 07:49)    Hemoglobin A1C, Whole Blood: 6.3:   Pregnancy Profile, Urine (12.02.19 @ 19:11)    Pregnancy Profile, Urine: Negative

## 2019-12-13 NOTE — PROGRESS NOTE BEHAVIORAL HEALTH - NSBHCHARTREVIEWVS_PSY_A_CORE FT
Vital Signs Last 24 Hrs  T(C): 36.9 (12 Dec 2019 16:46), Max: 37.1 (12 Dec 2019 10:00)  T(F): 98.5 (12 Dec 2019 16:46), Max: 98.8 (12 Dec 2019 10:00)  HR: 94 (12 Dec 2019 16:46) (82 - 94)  BP: 130/84 (12 Dec 2019 16:46) (109/67 - 130/84)  BP(mean): --  RR: 19 (12 Dec 2019 16:46) (19 - 20)  SpO2: 98% (12 Dec 2019 16:46) (96% - 98%) Vital Signs Last 24 Hrs  T(C): 36.7 (13 Dec 2019 09:00), Max: 36.9 (12 Dec 2019 16:46)  T(F): 98 (13 Dec 2019 09:00), Max: 98.5 (12 Dec 2019 16:46)  HR: 81 (13 Dec 2019 09:00) (81 - 94)  BP: 104/71 (13 Dec 2019 09:00) (104/71 - 130/84)  BP(mean): --  RR: 16 (13 Dec 2019 09:00) (16 - 19)  SpO2: 96% (13 Dec 2019 09:00) (96% - 98%)

## 2019-12-13 NOTE — PROGRESS NOTE BEHAVIORAL HEALTH - RISK ASSESSMENT
Static factors include age, sex, H/O schizophrenia, family structure, family history/genetic    Modifiable factors: T/T non-compliance, acute presentation, mood episode, probable modifiable delusional system    Protective factors include supportive family, engaged in work, Christian beliefs, future oriented, no access to guns, internal resilience,    Presently team is going to focus on the patient's inpatient stay and try to engage the patient in milieu/group therapy, and to upgrade on the medication dosage (Prolixin),  ---xxx  Risk elements:possible  history may be compatible with dx of  schizophrenia or history of schizoaffective illness; lives with family; appears depressed; possible manic history; history of bipolar disorder; appears irritable; may have history of violence and impulsivity; aggression issues; unable to sleep; issues with compliance; has been abused or traumatized; appears able to comprehend situation; uses or used alcohol; -mother; -father; -daughter; -family; recent death; may have access to weapons or weapons training; may have negative thinking;   At time of admission suicide risk was HIGH.    Static: chronic psychiatric disorder; mood issues; mood episodes; past violence; past violence potential; sleep issues; past psychiatric disorder; past trauma or abuse; substance abuse/dependance; loss of significant other; availability of a weapon or training;     Modifiable: treatment of psychotic sxs; treat underlying mood issues; reduce aggressive potential with treatment; improve sleep by addressing mood  or anxiety issues; address/enhance treatment compliance and supervision; address possible PTSD or trauma issues monitor for flashbacks or nightmares; substance abuse counselling and treatment and need for referral; assist engaging possible support network; help pt deal with loss; address weapon risk; address possible anger issues;     Protective: involved family; cognitively able to engage in treatment; may have involved family; review for degree of family engagement; has capacity to report internal thoughts to care providers;   Modifiable factors addressed in treatment plan; see summary and interval data for updates    Estimated length of stay based on admission data is 24 days. Estimated discharge date is: 12/26/19.
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
## 2019-12-14 RX ADMIN — LISINOPRIL 2.5 MILLIGRAM(S): 2.5 TABLET ORAL at 10:13

## 2019-12-14 RX ADMIN — Medication 50 MILLIGRAM(S): at 22:20

## 2019-12-14 RX ADMIN — METFORMIN HYDROCHLORIDE 1000 MILLIGRAM(S): 850 TABLET ORAL at 10:13

## 2019-12-14 RX ADMIN — FLUPHENAZINE HYDROCHLORIDE 25 MILLIGRAM(S): 1 TABLET, FILM COATED ORAL at 22:20

## 2019-12-14 RX ADMIN — PANTOPRAZOLE SODIUM 40 MILLIGRAM(S): 20 TABLET, DELAYED RELEASE ORAL at 07:49

## 2019-12-15 RX ADMIN — FLUPHENAZINE HYDROCHLORIDE 25 MILLIGRAM(S): 1 TABLET, FILM COATED ORAL at 21:49

## 2019-12-15 RX ADMIN — METFORMIN HYDROCHLORIDE 1000 MILLIGRAM(S): 850 TABLET ORAL at 11:00

## 2019-12-15 RX ADMIN — PANTOPRAZOLE SODIUM 40 MILLIGRAM(S): 20 TABLET, DELAYED RELEASE ORAL at 07:23

## 2019-12-15 RX ADMIN — LISINOPRIL 2.5 MILLIGRAM(S): 2.5 TABLET ORAL at 11:01

## 2019-12-15 RX ADMIN — Medication 50 MILLIGRAM(S): at 21:49

## 2019-12-16 PROCEDURE — 99232 SBSQ HOSP IP/OBS MODERATE 35: CPT

## 2019-12-16 RX ORDER — FLUPHENAZINE HYDROCHLORIDE 1 MG/1
30 TABLET, FILM COATED ORAL DAILY
Refills: 0 | Status: DISCONTINUED | OUTPATIENT
Start: 2019-12-16 | End: 2019-12-20

## 2019-12-16 RX ADMIN — LISINOPRIL 2.5 MILLIGRAM(S): 2.5 TABLET ORAL at 12:59

## 2019-12-16 RX ADMIN — Medication 50 MILLIGRAM(S): at 22:16

## 2019-12-16 RX ADMIN — PANTOPRAZOLE SODIUM 40 MILLIGRAM(S): 20 TABLET, DELAYED RELEASE ORAL at 07:19

## 2019-12-16 RX ADMIN — METFORMIN HYDROCHLORIDE 1000 MILLIGRAM(S): 850 TABLET ORAL at 12:58

## 2019-12-16 RX ADMIN — FLUPHENAZINE HYDROCHLORIDE 30 MILLIGRAM(S): 1 TABLET, FILM COATED ORAL at 22:17
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
## 2019-12-16 NOTE — PROGRESS NOTE BEHAVIORAL HEALTH - PROBLEM SELECTOR PLAN 1
Prolixin 10mg-->15mg(12/9/19)  Milieu Therapy; Group Therapy; Individual Therapy Prolixin 30mg   Milieu Therapy; Group Therapy; Individual Therapy
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
## 2019-12-16 NOTE — PROGRESS NOTE BEHAVIORAL HEALTH - NSBHCHARTREVIEWVS_PSY_A_CORE FT
Vital Signs Last 24 Hrs  T(C): 36.8 (15 Dec 2019 17:00), Max: 36.8 (15 Dec 2019 17:00)  T(F): 98.2 (15 Dec 2019 17:00), Max: 98.2 (15 Dec 2019 17:00)  HR: 94 (15 Dec 2019 17:00) (92 - 94)  BP: 120/75 (15 Dec 2019 17:00) (120/59 - 120/75)  BP(mean): --  RR: 18 (15 Dec 2019 17:00) (18 - 20)  SpO2: 99% (15 Dec 2019 17:00) (98% - 99%) Vital Signs Last 24 Hrs  T(C): 36.8 (16 Dec 2019 09:00), Max: 36.8 (15 Dec 2019 17:00)  T(F): 98.2 (16 Dec 2019 09:00), Max: 98.2 (15 Dec 2019 17:00)  HR: 80 (16 Dec 2019 09:00) (80 - 94)  BP: 106/76 (16 Dec 2019 09:00) (106/76 - 120/75)  BP(mean): --  RR: 16 (16 Dec 2019 09:00) (16 - 18)  SpO2: 94% (16 Dec 2019 09:00) (94% - 99%)
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
## 2019-12-17 PROCEDURE — 99232 SBSQ HOSP IP/OBS MODERATE 35: CPT

## 2019-12-17 RX ADMIN — Medication 50 MILLIGRAM(S): at 21:55

## 2019-12-17 RX ADMIN — METFORMIN HYDROCHLORIDE 1000 MILLIGRAM(S): 850 TABLET ORAL at 21:54

## 2019-12-17 RX ADMIN — PANTOPRAZOLE SODIUM 40 MILLIGRAM(S): 20 TABLET, DELAYED RELEASE ORAL at 07:14

## 2019-12-17 RX ADMIN — LISINOPRIL 2.5 MILLIGRAM(S): 2.5 TABLET ORAL at 21:54

## 2019-12-17 RX ADMIN — FLUPHENAZINE HYDROCHLORIDE 30 MILLIGRAM(S): 1 TABLET, FILM COATED ORAL at 21:55

## 2019-12-17 NOTE — PROGRESS NOTE BEHAVIORAL HEALTH - NSBHFUPINTERVALHXFT_PSY_A_CORE
The patient was seen this morning by this writer. Case discussed with interdisciplinary team. Patient continues to decline interest in AVILEZ at this time. She states that her mood is "alright" and been "getting a little better." She reports sleep as "okay" and says that she worke up at 430 this morning. Patient lying in bed at time of interview and states "I'm a little too tired from the Protonix". Pt states that she is upset that her mother Phuong does not want her to be discharged before 12/20 due to father's operation. Patient states "I don't need anyone to take are of me I've been taking care of myself for 15 years." She then requested that team no longer speak with mother and says "talk to my sister instead. Her number is [phone]." Otherwise, patient denies SI/HI/AH/VH/intent/plan at this time.

## 2019-12-17 NOTE — PROGRESS NOTE BEHAVIORAL HEALTH - NSBHCHARTREVIEWVS_PSY_A_CORE FT
Vital Signs Last 24 Hrs  T(C): 36.6 (16 Dec 2019 16:55), Max: 36.6 (16 Dec 2019 16:55)  T(F): 97.9 (16 Dec 2019 16:55), Max: 97.9 (16 Dec 2019 16:55)  HR: 93 (16 Dec 2019 16:55) (93 - 93)  BP: 119/62 (16 Dec 2019 16:55) (119/62 - 119/62)  BP(mean): --  RR: 18 (16 Dec 2019 16:55) (18 - 18)  SpO2: 96% (16 Dec 2019 16:55) (96% - 96%) Vital Signs Last 24 Hrs  T(C): 36.6 (16 Dec 2019 16:55), Max: 36.6 (16 Dec 2019 16:55)  T(F): 97.9 (16 Dec 2019 16:55), Max: 97.9 (16 Dec 2019 16:55)  HR: 93 (16 Dec 2019 16:55) (93 - 93)  BP: 119/62 (16 Dec 2019 16:55) (119/62 - 119/62)  BP(mean): --  RR: 18 (16 Dec 2019 16:55) (18 - 18)  SpO2: 96% (16 Dec 2019 16:55) (96% - 96%)

## 2019-12-17 NOTE — PROGRESS NOTE BEHAVIORAL HEALTH - RISK ASSESSMENT
Static factors include age, sex, H/O schizophrenia, family structure, family history/genetic    Modifiable factors: T/T non-compliance, acute presentation, mood episode, probable modifiable delusional system    Protective factors include supportive family, engaged in work, Sabianism beliefs, future oriented, no access to guns, internal resilience,    Presently team is going to focus on the patient's inpatient stay and try to engage the patient in milieu/group therapy, and to upgrade on the medication dosage (Prolixin),  ---xxx  Risk elements:possible  history may be compatible with dx of  schizophrenia or history of schizoaffective illness; lives with family; appears depressed; possible manic history; history of bipolar disorder; appears irritable; may have history of violence and impulsivity; aggression issues; unable to sleep; issues with compliance; has been abused or traumatized; appears able to comprehend situation; uses or used alcohol; -mother; -father; -daughter; -family; recent death; may have access to weapons or weapons training; may have negative thinking;   At time of admission suicide risk was HIGH.    Static: chronic psychiatric disorder; mood issues; mood episodes; past violence; past violence potential; sleep issues; past psychiatric disorder; past trauma or abuse; substance abuse/dependance; loss of significant other; availability of a weapon or training;     Modifiable: treatment of psychotic sxs; treat underlying mood issues; reduce aggressive potential with treatment; improve sleep by addressing mood  or anxiety issues; address/enhance treatment compliance and supervision; address possible PTSD or trauma issues monitor for flashbacks or nightmares; substance abuse counselling and treatment and need for referral; assist engaging possible support network; help pt deal with loss; address weapon risk; address possible anger issues;     Protective: involved family; cognitively able to engage in treatment; may have involved family; review for degree of family engagement; has capacity to report internal thoughts to care providers;   Modifiable factors addressed in treatment plan; see summary and interval data for updates    Estimated length of stay based on admission data is 24 days. Estimated discharge date is: 12/26/19.
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
## 2019-12-18 PROCEDURE — 99232 SBSQ HOSP IP/OBS MODERATE 35: CPT

## 2019-12-18 RX ADMIN — Medication 50 MILLIGRAM(S): at 22:20

## 2019-12-18 RX ADMIN — PANTOPRAZOLE SODIUM 40 MILLIGRAM(S): 20 TABLET, DELAYED RELEASE ORAL at 06:49

## 2019-12-18 RX ADMIN — METFORMIN HYDROCHLORIDE 1000 MILLIGRAM(S): 850 TABLET ORAL at 10:32

## 2019-12-18 RX ADMIN — FLUPHENAZINE HYDROCHLORIDE 30 MILLIGRAM(S): 1 TABLET, FILM COATED ORAL at 10:24
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
## 2019-12-18 NOTE — PROGRESS NOTE BEHAVIORAL HEALTH - NSBHCHARTREVIEWVS_PSY_A_CORE FT
Vital Signs Last 24 Hrs  T(C): 37.1 (17 Dec 2019 16:27), Max: 37.1 (17 Dec 2019 16:27)  T(F): 98.7 (17 Dec 2019 16:27), Max: 98.7 (17 Dec 2019 16:27)  HR: 93 (17 Dec 2019 16:27) (85 - 93)  BP: 115/73 (17 Dec 2019 16:27) (107/59 - 115/73)  BP(mean): --  RR: 18 (17 Dec 2019 16:27) (18 - 18)  SpO2: 96% (17 Dec 2019 16:27) (96% - 96%)

## 2019-12-19 PROCEDURE — 99232 SBSQ HOSP IP/OBS MODERATE 35: CPT

## 2019-12-19 RX ADMIN — FLUPHENAZINE HYDROCHLORIDE 30 MILLIGRAM(S): 1 TABLET, FILM COATED ORAL at 09:37

## 2019-12-19 RX ADMIN — METFORMIN HYDROCHLORIDE 1000 MILLIGRAM(S): 850 TABLET ORAL at 09:37

## 2019-12-19 RX ADMIN — LISINOPRIL 2.5 MILLIGRAM(S): 2.5 TABLET ORAL at 09:37

## 2019-12-19 RX ADMIN — PANTOPRAZOLE SODIUM 40 MILLIGRAM(S): 20 TABLET, DELAYED RELEASE ORAL at 07:12

## 2019-12-19 RX ADMIN — Medication 50 MILLIGRAM(S): at 21:33

## 2019-12-19 NOTE — PROGRESS NOTE BEHAVIORAL HEALTH - NSBHCHARTREVIEWVS_PSY_A_CORE FT
Vital Signs Last 24 Hrs  T(C): 37 (18 Dec 2019 16:35), Max: 37 (18 Dec 2019 09:00)  T(F): 98.6 (18 Dec 2019 16:35), Max: 98.6 (18 Dec 2019 09:00)  HR: 98 (18 Dec 2019 16:35) (84 - 98)  BP: 105/71 (18 Dec 2019 16:35) (84/60 - 105/71)  BP(mean): --  RR: 18 (18 Dec 2019 16:35) (17 - 18)  SpO2: 96% (18 Dec 2019 16:35) (95% - 96%)
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
## 2019-12-19 NOTE — PROGRESS NOTE BEHAVIORAL HEALTH - RISK ASSESSMENT
Static factors include age, sex, H/O schizophrenia, family structure, family history/genetic    Modifiable factors: T/T non-compliance, acute presentation, mood episode, probable modifiable delusional system    Protective factors include supportive family, engaged in work, Jewish beliefs, future oriented, no access to guns, internal resilience,    Presently team is going to focus on the patient's inpatient stay and try to engage the patient in milieu/group therapy, and to upgrade on the medication dosage (Prolixin),  ---xxx  Risk elements:possible  history may be compatible with dx of  schizophrenia or history of schizoaffective illness; lives with family; appears depressed; possible manic history; history of bipolar disorder; appears irritable; may have history of violence and impulsivity; aggression issues; unable to sleep; issues with compliance; has been abused or traumatized; appears able to comprehend situation; uses or used alcohol; -mother; -father; -daughter; -family; recent death; may have access to weapons or weapons training; may have negative thinking;   At time of admission suicide risk was HIGH.    Static: chronic psychiatric disorder; mood issues; mood episodes; past violence; past violence potential; sleep issues; past psychiatric disorder; past trauma or abuse; substance abuse/dependance; loss of significant other; availability of a weapon or training;     Modifiable: treatment of psychotic sxs; treat underlying mood issues; reduce aggressive potential with treatment; improve sleep by addressing mood  or anxiety issues; address/enhance treatment compliance and supervision; address possible PTSD or trauma issues monitor for flashbacks or nightmares; substance abuse counselling and treatment and need for referral; assist engaging possible support network; help pt deal with loss; address weapon risk; address possible anger issues;     Protective: involved family; cognitively able to engage in treatment; may have involved family; review for degree of family engagement; has capacity to report internal thoughts to care providers;   Modifiable factors addressed in treatment plan; see summary and interval data for updates    Estimated length of stay based on admission data is 24 days. Estimated discharge date is: 12/26/19.

## 2019-12-20 PROCEDURE — 99232 SBSQ HOSP IP/OBS MODERATE 35: CPT

## 2019-12-20 RX ORDER — FLUPHENAZINE HYDROCHLORIDE 1 MG/1
30 TABLET, FILM COATED ORAL AT BEDTIME
Refills: 0 | Status: DISCONTINUED | OUTPATIENT
Start: 2019-12-21 | End: 2019-12-23

## 2019-12-20 RX ADMIN — Medication 50 MILLIGRAM(S): at 22:15

## 2019-12-20 RX ADMIN — FLUPHENAZINE HYDROCHLORIDE 30 MILLIGRAM(S): 1 TABLET, FILM COATED ORAL at 10:20

## 2019-12-20 RX ADMIN — LISINOPRIL 2.5 MILLIGRAM(S): 2.5 TABLET ORAL at 10:20

## 2019-12-20 RX ADMIN — METFORMIN HYDROCHLORIDE 1000 MILLIGRAM(S): 850 TABLET ORAL at 10:20

## 2019-12-20 RX ADMIN — PANTOPRAZOLE SODIUM 40 MILLIGRAM(S): 20 TABLET, DELAYED RELEASE ORAL at 07:14

## 2019-12-20 NOTE — PROGRESS NOTE BEHAVIORAL HEALTH - RISK ASSESSMENT
Static factors include age, sex, H/O schizophrenia, family structure, family history/genetic    Modifiable factors: T/T non-compliance, acute presentation, mood episode, probable modifiable delusional system    Protective factors include supportive family, engaged in work, Advent beliefs, future oriented, no access to guns, internal resilience,    Presently team is going to focus on the patient's inpatient stay and try to engage the patient in milieu/group therapy, and to upgrade on the medication dosage (Prolixin),  ---xxx  Risk elements:possible  history may be compatible with dx of  schizophrenia or history of schizoaffective illness; lives with family; appears depressed; possible manic history; history of bipolar disorder; appears irritable; may have history of violence and impulsivity; aggression issues; unable to sleep; issues with compliance; has been abused or traumatized; appears able to comprehend situation; uses or used alcohol; -mother; -father; -daughter; -family; recent death; may have access to weapons or weapons training; may have negative thinking;   At time of admission suicide risk was HIGH.    Static: chronic psychiatric disorder; mood issues; mood episodes; past violence; past violence potential; sleep issues; past psychiatric disorder; past trauma or abuse; substance abuse/dependance; loss of significant other; availability of a weapon or training;     Modifiable: treatment of psychotic sxs; treat underlying mood issues; reduce aggressive potential with treatment; improve sleep by addressing mood  or anxiety issues; address/enhance treatment compliance and supervision; address possible PTSD or trauma issues monitor for flashbacks or nightmares; substance abuse counselling and treatment and need for referral; assist engaging possible support network; help pt deal with loss; address weapon risk; address possible anger issues;     Protective: involved family; cognitively able to engage in treatment; may have involved family; review for degree of family engagement; has capacity to report internal thoughts to care providers;   Modifiable factors addressed in treatment plan; see summary and interval data for updates    Estimated length of stay based on admission data is 24 days. Estimated discharge date is: 12/26/19.  12/20: Patient stable on the medications, presently team to continue to encourage her for medication compliance and discharge on monday (12/23)

## 2019-12-20 NOTE — PROGRESS NOTE BEHAVIORAL HEALTH - NSBHFUPINTERVALHXFT_PSY_A_CORE
The patient was seen in the morning, she was calm and cooperative, endorsed compliance to the medications and mentioned that her father was having the SX today, the pateint also mentioned that she was ok with the plan of the discharge on Monday. Presently denied any suicidal ideation/impulse/thoughts and is also not showing any paranoia/perceptional difficulty. The patient was seen in the morning, she was calm and cooperative, endorsed compliance to the medications and mentioned that her father was having the SX today, the patient also mentioned that she was ok with the plan of the discharge on Monday. Presently denied any suicidal ideation/impulse/thoughts and is also not showing any paranoia/perceptional difficulty.

## 2019-12-20 NOTE — PROGRESS NOTE BEHAVIORAL HEALTH - NSBHCHARTREVIEWVS_PSY_A_CORE FT
Vital Signs Last 24 Hrs  T(C): 37 (19 Dec 2019 17:26), Max: 37.3 (19 Dec 2019 09:00)  T(F): 98.6 (19 Dec 2019 17:26), Max: 99.1 (19 Dec 2019 09:00)  HR: 92 (19 Dec 2019 17:26) (87 - 92)  BP: 110/70 (19 Dec 2019 17:26) (110/70 - 119/87)  BP(mean): --  RR: 16 (19 Dec 2019 09:00) (16 - 16)  SpO2: 96% (19 Dec 2019 17:26) (96% - 97%) Vital Signs Last 24 Hrs  T(C): 36.8 (20 Dec 2019 16:18), Max: 37 (19 Dec 2019 17:26)  T(F): 98.2 (20 Dec 2019 16:18), Max: 98.6 (19 Dec 2019 17:26)  HR: 94 (20 Dec 2019 16:18) (78 - 94)  BP: 100/65 (20 Dec 2019 16:18) (100/65 - 110/70)  BP(mean): --  RR: 19 (20 Dec 2019 16:18) (18 - 19)  SpO2: 97% (20 Dec 2019 16:18) (96% - 97%)

## 2019-12-20 NOTE — PROGRESS NOTE BEHAVIORAL HEALTH - SUMMARY
45 yo  patient, domiciled, employed, with past psychiatric history f schizophrenia who was transferred from [facility] to [facility] after she presented there in a state of agitated frenzy shouting that Kendrick and Linda had communicated with her about the impending destruction of the earth, the patient was also intensely delusional and had mentioned that her mother had been transformed into Satan, ; The patient was seen this morning, along with the student-doctor, the patient was initially found to be playful and overtly pleasant, "oh, come on in", also was very elaborate about her family, mentioned that she lived with her father, step mother, brother and 2 daughters, the patient was also quite excited about getting remarried to a person named "Mathieu" who lived in [place], " we had  in the streets, at first,but right now I am going to be  properly". The patient got agitated when the reason for the admission was enquired, initially she mentioned that it was a misunderstanding, but later mentioned that it was because of her agitation at home. 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 however she denied any auditory hallucinations/other perceptible phenomenon, and also did not endorse any other forms of paranoia towards random people in the streets, she mentioned feeling depressed time to time because of Phuong, and resorting to "black magic" to contain her burns, she was also planning to tell her father to kick out this "Tramp" from the house, The patient also mentioned being Hospitalised for about 2 weeks in  at [facility] unit ,but wasn't able to provide the circumstances which brought her over there. also reported being on Prolixin 2.5 mg for a long period of time and the non-essentiality of any kind of psychotropic medications at this stage (" why do you think I need medication, its such a small dose, wont do anything". Identified herself as Jehova's witness, mentioned that suicide was sacrilegious to them, however mentioned that she hadn't slept for about 2 nights straight before she decompensated. Reports intact appetie. About future plans, stated wishes of going to finish the [employer], and eventually change her workplace, ; With the patient' s consent, the team also reached...t in bed; minimal eye contact; still does not want AVILEZ; denies s/h i/i/p or avh; speech soft and lilttle ;slightly blocked   risk is now MODERATE as mood improves and psychotic sxs lessen.  Prolixin titrated upwards to 15mg at night.   ;;:; much like ; pleasant ; attends some groups; Continue Prolixn titration to 20mg a day for psychosis.    ;;:; feels better; still stays in bed and is somewhat guarded but pleasant; good adls; anticipates meeting with her mother in anticipation of discharge soon; however continues to reject Gordon; risk is MODERATE in view of improved mood.   ;;:; anticipates meeting with mother this afternoon; noted by staff to be avoidant but patient states she attends selective groups; denies s/h i/i/p or avh; but appears internally preocucpied but less labile; less suspicious; avoids eye contact; good adls.  risk MODERATE as mood has improved.   ;;:; good eye contact; speech cleaer; no spontaneous mention of parnaoid thinking; no s/h i/i/p or avh;   states that she is going home in a couple of days;  disputes that family does not want her home;  compliant with Prolixn .  Risk MODERATE in view of improved mood;  continue to make efforts to transition fo after care;   ;;:; similiar presentation to ; aware of need to wait for support network at home as father has an operation this week.   sitting in bed; less constricted; good eye contact; speech cleaer; no spontaneous mention of parnaoid thinking; no s/h i/i/p or avh;  MODERATE risk with psychosis treated.  Has supportive family.   ;;:; pleasant cheerful and sitll strange ; smiles a bit but imporverished thinking; anxious but not excessively; good adls;  antiicpates returning home soon;  no s/h i/i/p or avh; Risk unchanged.   ;;:; "They say I am going home on Monday";  anxious but also smiles appropriately; better eye contact; still refuses AVILEZ; speech clear; no tremor; no mention of avh or s/h i/i/p;  risk unchaged (see );  anticipate dc on  .

## 2019-12-21 RX ADMIN — FLUPHENAZINE HYDROCHLORIDE 30 MILLIGRAM(S): 1 TABLET, FILM COATED ORAL at 21:45

## 2019-12-21 RX ADMIN — PANTOPRAZOLE SODIUM 40 MILLIGRAM(S): 20 TABLET, DELAYED RELEASE ORAL at 07:43

## 2019-12-21 RX ADMIN — Medication 50 MILLIGRAM(S): at 21:46

## 2019-12-21 RX ADMIN — METFORMIN HYDROCHLORIDE 1000 MILLIGRAM(S): 850 TABLET ORAL at 10:38

## 2019-12-21 RX ADMIN — LISINOPRIL 2.5 MILLIGRAM(S): 2.5 TABLET ORAL at 10:38

## 2019-12-22 RX ADMIN — METFORMIN HYDROCHLORIDE 1000 MILLIGRAM(S): 850 TABLET ORAL at 09:36

## 2019-12-22 RX ADMIN — LISINOPRIL 2.5 MILLIGRAM(S): 2.5 TABLET ORAL at 09:36

## 2019-12-22 RX ADMIN — PANTOPRAZOLE SODIUM 40 MILLIGRAM(S): 20 TABLET, DELAYED RELEASE ORAL at 09:36

## 2019-12-22 RX ADMIN — FLUPHENAZINE HYDROCHLORIDE 30 MILLIGRAM(S): 1 TABLET, FILM COATED ORAL at 21:27

## 2019-12-23 VITALS
HEART RATE: 91 BPM | OXYGEN SATURATION: 97 % | SYSTOLIC BLOOD PRESSURE: 114 MMHG | DIASTOLIC BLOOD PRESSURE: 79 MMHG | RESPIRATION RATE: 16 BRPM | TEMPERATURE: 98 F

## 2019-12-23 PROCEDURE — 83036 HEMOGLOBIN GLYCOSYLATED A1C: CPT

## 2019-12-23 PROCEDURE — 87389 HIV-1 AG W/HIV-1&-2 AB AG IA: CPT

## 2019-12-23 PROCEDURE — 85027 COMPLETE CBC AUTOMATED: CPT

## 2019-12-23 PROCEDURE — 80053 COMPREHEN METABOLIC PANEL: CPT

## 2019-12-23 PROCEDURE — 86780 TREPONEMA PALLIDUM: CPT

## 2019-12-23 PROCEDURE — 82962 GLUCOSE BLOOD TEST: CPT

## 2019-12-23 PROCEDURE — 99238 HOSP IP/OBS DSCHRG MGMT 30/<: CPT

## 2019-12-23 PROCEDURE — 80061 LIPID PANEL: CPT

## 2019-12-23 PROCEDURE — 36415 COLL VENOUS BLD VENIPUNCTURE: CPT

## 2019-12-23 PROCEDURE — 81025 URINE PREGNANCY TEST: CPT

## 2019-12-23 RX ORDER — FLUPHENAZINE HYDROCHLORIDE 1 MG/1
3 TABLET, FILM COATED ORAL
Qty: 42 | Refills: 0
Start: 2019-12-23 | End: 2020-01-05

## 2019-12-23 RX ORDER — BENZTROPINE MESYLATE 1 MG
1 TABLET ORAL
Qty: 7 | Refills: 0
Start: 2019-12-23 | End: 2019-12-29

## 2019-12-23 RX ORDER — METFORMIN HYDROCHLORIDE 850 MG/1
1 TABLET ORAL
Qty: 14 | Refills: 0
Start: 2019-12-23 | End: 2020-01-05

## 2019-12-23 RX ORDER — LISINOPRIL 2.5 MG/1
1 TABLET ORAL
Qty: 14 | Refills: 0
Start: 2019-12-23 | End: 2020-01-05

## 2019-12-23 RX ORDER — PANTOPRAZOLE SODIUM 20 MG/1
1 TABLET, DELAYED RELEASE ORAL
Qty: 14 | Refills: 0
Start: 2019-12-23 | End: 2020-01-05

## 2019-12-23 RX ADMIN — METFORMIN HYDROCHLORIDE 1000 MILLIGRAM(S): 850 TABLET ORAL at 10:29

## 2019-12-23 RX ADMIN — PANTOPRAZOLE SODIUM 40 MILLIGRAM(S): 20 TABLET, DELAYED RELEASE ORAL at 07:09

## 2019-12-23 RX ADMIN — LISINOPRIL 2.5 MILLIGRAM(S): 2.5 TABLET ORAL at 10:30

## 2019-12-23 NOTE — PROGRESS NOTE BEHAVIORAL HEALTH - NSBHADMITMEDEDUDETAILS_A_CORE FT
discussion of medications conducted by provider, and patient agrees to try Prolixin or alternative antipsychotic. Risks, benefits and RADAMES discussed.
pt informed of rba

## 2019-12-23 NOTE — PROGRESS NOTE BEHAVIORAL HEALTH - MODIFICATIONS
see below
as above
see below

## 2019-12-23 NOTE — PROGRESS NOTE BEHAVIORAL HEALTH - RISK ASSESSMENT
Static factors include age, sex, H/O schizophrenia, family structure, family history/genetic    Modifiable factors: T/T non-compliance, acute presentation, mood episode, probable modifiable delusional system    Protective factors include supportive family, engaged in work, Restorationism beliefs, future oriented, no access to guns, internal resilience,    Presently team is going to focus on the patient's inpatient stay and try to engage the patient in milieu/group therapy, and to upgrade on the medication dosage (Prolixin),  ---xxx  Risk elements:possible  history may be compatible with dx of  schizophrenia or history of schizoaffective illness; lives with family; appears depressed; possible manic history; history of bipolar disorder; appears irritable; may have history of violence and impulsivity; aggression issues; unable to sleep; issues with compliance; has been abused or traumatized; appears able to comprehend situation; uses or used alcohol; -mother; -father; -daughter; -family; recent death; may have access to weapons or weapons training; may have negative thinking;   At time of admission suicide risk was HIGH.    Static: chronic psychiatric disorder; mood issues; mood episodes; past violence; past violence potential; sleep issues; past psychiatric disorder; past trauma or abuse; substance abuse/dependance; loss of significant other; availability of a weapon or training;     Modifiable: treatment of psychotic sxs; treat underlying mood issues; reduce aggressive potential with treatment; improve sleep by addressing mood  or anxiety issues; address/enhance treatment compliance and supervision; address possible PTSD or trauma issues monitor for flashbacks or nightmares; substance abuse counselling and treatment and need for referral; assist engaging possible support network; help pt deal with loss; address weapon risk; address possible anger issues;     Protective: involved family; cognitively able to engage in treatment; may have involved family; review for degree of family engagement; has capacity to report internal thoughts to care providers;   Modifiable factors addressed in treatment plan; see summary and interval data for updates    Estimated length of stay based on admission data is 24 days. Estimated discharge date is: 12/26/19.  12/20: Patient stable on the medications, presently team to continue to encourage her for medication compliance and discharge on monday (12/23)  12/23: Patient to be discharged today, compliant to meds, denied any suicidal intent/thoughts, presently future oriented. Static factors include age, sex, H/O schizophrenia, family structure, family history/genetic    Modifiable factors: T/T non-compliance, acute presentation, mood episode, probable modifiable delusional system    Protective factors include supportive family, engaged in work, Rastafarian beliefs, future oriented, no access to guns, internal resilience,    Presently team is going to focus on the patient's inpatient stay and try to engage the patient in milieu/group therapy, and to upgrade on the medication dosage (Prolixin),  ---xxx  Risk elements:possible  history may be compatible with dx of  schizophrenia or history of schizoaffective illness; lives with family; appears depressed; possible manic history; history of bipolar disorder; appears irritable; may have history of violence and impulsivity; aggression issues; unable to sleep; issues with compliance; has been abused or traumatized; appears able to comprehend situation; uses or used alcohol; -mother; -father; -daughter; -family; recent death; may have access to weapons or weapons training; may have negative thinking;   At time of admission suicide risk was HIGH.    Static: chronic psychiatric disorder; mood issues; mood episodes; past violence; past violence potential; sleep issues; past psychiatric disorder; past trauma or abuse; substance abuse/dependance; loss of significant other; availability of a weapon or training;     Modifiable: treatment of psychotic sxs; treat underlying mood issues; reduce aggressive potential with treatment; improve sleep by addressing mood  or anxiety issues; address/enhance treatment compliance and supervision; address possible PTSD or trauma issues monitor for flashbacks or nightmares; substance abuse counselling and treatment and need for referral; assist engaging possible support network; help pt deal with loss; address weapon risk; address possible anger issues;     Protective: involved family; cognitively able to engage in treatment; may have involved family; review for degree of family engagement; has capacity to report internal thoughts to care providers;   Modifiable factors addressed in treatment plan; see summary and interval data for updates    Estimated length of stay based on admission data is 24 days. Estimated discharge date is: 12/26/19.  12/20: Patient stable on the medications, presently team to continue to encourage her for medication compliance and discharge on monday (12/23)  12/23: Patient to be discharged today, compliant to meds, denied any suicidal intent/thoughts, presently future oriented.  risk LOW while on medication.

## 2019-12-23 NOTE — PROGRESS NOTE BEHAVIORAL HEALTH - NSBHADMITDANGEROTHERS_PSY_A_CORE
assaultive behavior

## 2019-12-23 NOTE — PROGRESS NOTE BEHAVIORAL HEALTH - SUMMARY
45 yo  patient, domiciled, employed, with past psychiatric history f schizophrenia who [place] [facility] [place] [facility] after she presented there in a state of agitated frenzy shouting that Kendrick and Linda had communicated with her about the impending destruction of the earth, the patient was also intensely delusional and had mentioned that her mother had been transformed into Satan, ; The patient was seen this morning, along with the student-doctor, the patient was initially found to be playful and overtly pleasant, "oh, come on in", also was very elaborate about her family, mentioned that she lived with her father, step mother, brother and 2 daughters, the patient was also quite excited about getting remarried to a person named "Mathieu" who lived in [place], " we had  in the streets, at first,but right now I am going to be  properly". The patient got agitated when the reason for the admission was enquired, initially she mentioned that it was a misunderstanding, but later mentioned that it was because of her agitation at home. 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 however she denied any auditory hallucinations/other perceptible phenomenon, and also did not endorse any other forms of paranoia towards random people in the streets, she mentioned feeling depressed time to time because of Phuong, and resorting to "black magic" to contain her burns, she was also planning to tell her father to kick out this "Tramp" from the house, The patient also mentioned being Hospitalised for about 2 weeks in  at [facility] unit ,but wasn't able to provide the circumstances which brought her over there. also reported being on Prolixin 2.5 mg for a long period of time and the non-essentiality of any kind of psychotropic medications at this stage (" why do you think I need medication, its such a small dose, wont do anything". Identified herself as Jehova's witness, mentioned that suicide was sacrilegious to them, however mentioned that she hadn't slept for about 2 nights straight before she decompensated. Reports intact appetie. About future plans, stated wishes of going to finish the [employer], and eventually change her workplace, ; With the patient' s consent, the team also reached...t in bed; minimal eye contact; still does not want AVILEZ; denies s/h i/i/p or avh; speech soft and lilttle ;slightly blocked   risk is now MODERATE as mood improves and psychotic sxs lessen.  Prolixin titrated upwards to 15mg at night.   ;;:; much like ; pleasant ; attends some groups; Continue Prolixn titration to 20mg a day for psychosis.    ;;:; feels better; still stays in bed and is somewhat guarded but pleasant; good adls; anticipates meeting with her mother in anticipation of discharge soon; however continues to reject Gordon; risk is MODERATE in view of improved mood.   ;;:; anticipates meeting with mother this afternoon; noted by staff to be avoidant but patient states she attends selective groups; denies s/h i/i/p or avh; but appears internally preocucpied but less labile; less suspicious; avoids eye contact; good adls.  risk MODERATE as mood has improved.   ;;:; good eye contact; speech cleaer; no spontaneous mention of parnaoid thinking; no s/h i/i/p or avh;   states that she is going home in a couple of days;  disputes that family does not want her home;  compliant with Prolixn .  Risk MODERATE in view of improved mood;  continue to make efforts to transition fo after care;   ;;:; similiar presentation to ; aware of need to wait for support network at home as father has an operation this week.   sitting in bed; less constricted; good eye contact; speech cleaer; no spontaneous mention of parnaoid thinking; no s/h i/i/p or avh;  MODERATE risk with psychosis treated.  Has supportive family.   ;;:; pleasant cheerful and sitll strange ; smiles a bit but imporverished thinking; anxious but not excessively; good adls;  antiicpates returning home soon;  no s/h i/i/p or avh; Risk unchanged.   ;;:; "They say I am going home on Monday";  anxious but also smiles appropriately; better eye contact; still refuses AVILEZ; speech clear; no tremor; no mention of avh or s/h i/i/p;  risk unchaged (see );  anticipate dc on  .

## 2019-12-23 NOTE — PROGRESS NOTE BEHAVIORAL HEALTH - NSBHFUPMEDSE_PSY_A_CORE
None known
Yes
None known
Yes
None known
None known

## 2019-12-23 NOTE — PROGRESS NOTE BEHAVIORAL HEALTH - AXIS III
Diabetes

## 2019-12-23 NOTE — PROGRESS NOTE BEHAVIORAL HEALTH - NSBHFUPINTERVALHXFT_PSY_A_CORE
The patient was seen in the morning , she was in good spirits and greeted the writer, she endorsed future compliance to her oral medications, mentioned that she was ready to go home and was able to describe to the writer about her future plans. She also denied any suicidal ideations/thoughts/impulses as well as any paranoia/ perceptional disturbances. Presently it is the treatment team's impression that the patient is fit for discharge.

## 2019-12-23 NOTE — PROGRESS NOTE BEHAVIORAL HEALTH - NSBHCHARTREVIEWVS_PSY_A_CORE FT
Vital Signs Last 24 Hrs  T(C): 36.9 (22 Dec 2019 17:00), Max: 36.9 (22 Dec 2019 17:00)  T(F): 98.5 (22 Dec 2019 17:00), Max: 98.5 (22 Dec 2019 17:00)  HR: 95 (22 Dec 2019 17:00) (79 - 95)  BP: 109/72 (22 Dec 2019 17:00) (109/72 - 114/74)  BP(mean): --  RR: 16 (22 Dec 2019 17:00) (16 - 20)  SpO2: 96% (22 Dec 2019 17:00) (96% - 97%) Vital Signs Last 24 Hrs  T(C): 36.8 (23 Dec 2019 09:00), Max: 36.8 (23 Dec 2019 09:00)  T(F): 98.3 (23 Dec 2019 09:00), Max: 98.3 (23 Dec 2019 09:00)  HR: 91 (23 Dec 2019 09:00) (91 - 91)  BP: 114/79 (23 Dec 2019 09:00) (114/79 - 114/79)  BP(mean): --  RR: 16 (23 Dec 2019 09:00) (16 - 16)  SpO2: 97% (23 Dec 2019 09:00) (97% - 97%)

## 2019-12-23 NOTE — PROGRESS NOTE BEHAVIORAL HEALTH - CASE SUMMARY
;;12/2:; sitting in bed; irritable; generally oriented; denies need for medications; discounts concerns about her paranoid thinking; mostly protests being in the hosptial;  aware that Prolixin will be started at 5mg; that 2.5mg daily is a homeopathic dose;  will try to obtain more history and anticipate need for TOO if patient continues to demonstrate irritability and demonstrates paranoid thinking.   ;;12/3:; sitting in bed avoiding eye contact; irritable; rejecting all medications; disheveled; staff continues to observe paranoid thinking.  Encourage patient to take medication but will likely file for TOO in view of persistent paranoid thinking and mood instability.  *Revised dx: LONG DESCRIPTION (VALID ICD-10 ) (COD.E ) *Revised dx: Paranoid schizophrenia (F20.0 )...  ;;12/2:; sitting in bed; irritable; generally oriented; denies need for medications; discounts concerns about her paranoid thinking; mostly protests being in the hosptial;  aware that Prolixin will be started at 5mg; that 2.5mg daily is a homeopathic dose;  will try to obtain more history and anticipate need for TOO if patient continues to demonstrate irritability and demonstrates paranoid thinking.   ;;12/3:; sitting in bed avoiding eye contact; irritable; rejecting all medications; disheveled; staff continues to observe paranoid thinking.  Encourage patient to take medication but will likely file for TOO in view of persistent paranoid thinking and mood instability.  *Revised dx: LONG DESCRIPTION (VALID ICD-10 ) (COD.E ) *Revised dx: Paranoid schizophrenia (F20.0 )
44F pph Schizophrenia responding well to current medications with no side effects reported or observed. Risks, benefits, and potential side effects of all medications discussed with patient. Current risk of self-harm due to psychotic episode will be mitigated by use of medication i.e. Prolixin. Inpatient Hospitalization is necessary at this time to ensure pt safety, stabilize symptoms, and plan a safe discharge.
45 yo  patient, domiciled, employed, with past psychiatric history f schizophrenia who was transferred from [facility] to [facility] after she presented there in a state of agitated frenzy shouting that Kendrick and Linda had communicated with her about the impending destruction of the earth, the patient was also intensely delusional and had mentioned that her mother had been transformed into Satan, ; The patient was seen this morning, along with the student-doctor, the patient was initially found to be playful and overtly pleasant, "oh, come on in", also was very elaborate about her family, mentioned that she lived with her father, step mother, brother and 2 daughters, the patient was also quite excited about getting remarried to a person named "Mathieu" who lived in [place], " we had  in the streets, at first,but right now I am going to be  properly". The patient got agitated when the reason for the admission was enquired, initially she mentioned that it was a misunderstanding, but later mentioned that it was because of her agitation at home. 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 however she denied any auditory hallucinations/other perceptible phenomenon, and also did not endorse any other forms of paranoia towards...t appears internally preocucpied but less labile; less suspicious; avoids eye contact; good adls.  risk MODERATE as mood has improved.  ;;:; good eye contact; speech cleaer; no spontaneous mention of parnaoid thinking; no s/h i/i/p or avh;   states that she is going home in a couple of days;  disputes that family does not want her home;  compliant with Prolixn .  Risk MODERATE in view of improved mood;  continue to make efforts to transition fo after care;  ;;:; similiar presentation to ; aware of need to wait for support network at home as father has an operation this week.   sitting in bed; less constricted; good eye contact; speech cleaer; no spontaneous mention of parnaoid thinking; no s/h i/i/p or avh;  MODERATE risk with psychosis treated.  Has supportive family.  ;;:; pleasant cheerful and sitll strange ; smiles a bit but imporverished thinking; anxious but not excessively; good adls;  antiicpates returning home soon;  no s/h i/i/p or avh; Risk unchanged.
43 yo  patient, domiciled, employed, with past psychiatric history f schizophrenia who was transferred from [facility] to [facility] after she presented there in a state of agitated frenzy shouting that Kendrick and Linda had communicated with her about the impending destruction of the earth, the patient was also intensely delusional and had mentioned that her mother had been transformed into Satan, ; The patient was seen this morning, along with the student-doctor, the patient was initially found to be playful and overtly pleasant, "oh, come on in", also was very elaborate about her family, mentioned that she lived with her father, step mother, brother and 2 daughters, the patient was also quite excited about getting remarried to a person named "Mathieu" who lived in [place], " we had  in the streets, at first,but right now I am going to be  properly". The patient got agitated when the reason for the admission was enquired, initially she mentioned that it was a misunderstanding, but later mentioned that it was because of her agitation at home. 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 however she denied any auditory hallucinations/other perceptible phenomenon, and also did not endorse any other forms of paranoia towards... mood improves and psychotic sxs lessen.  Prolixin to be titrated upwards.  ;;:; pleasant but avoidant in bed; minimal eye contact; still does not want AVILEZ; denies s/h i/i/p or avh; speech soft and lilttle ;slightly blocked   risk is now MODERATE as mood improves and psychotic sxs lessen.  Prolixin titrated upwards to 15mg at night.  ;;:; much like ; pleasant ; attends some groups; Continue Prolixn titration to 20mg a day for psychosis.   ;;:; feels better; still stays in bed and is somewhat guarded but pleasant; good adls; anticipates meeting with her mother in anticipation of discharge soon; however continues to reject Gordon; risk is MODERATE in view of improved mood.  ;;:; anticipates meeting with mother this afternoon; noted by staff to be avoidant but patient states she attends selective groups; denies s/h i/i/p or avh; but appears internally preocucpied but less labile; less suspicious; avoids eye contact; good adls.  risk MODERATE as mood has improved.
45 yo  patient, domiciled, employed, with past psychiatric history f schizophrenia who was transferred from [facility] to [facility] after she presented there in a state of agitated frenzy shouting that Kendrick and Linda had communicated with her about the impending destruction of the earth, the patient was also intensely delusional and had mentioned that her mother had been transformed into Satan, ; The patient was seen this morning, along with the student-doctor, the patient was initially found to be playful and overtly pleasant, "oh, come on in", also was very elaborate about her family, mentioned that she lived with her father, step mother, brother and 2 daughters, the patient was also quite excited about getting remarried to a person named "Mathieu" who lived in [place], " we had  in the streets, at first,but right now I am going to be  properly". The patient got agitated when the reason for the admission was enquired, initially she mentioned that it was a misunderstanding, but later mentioned that it was because of her agitation at home. 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 however she denied any auditory hallucinations/other perceptible phenomenon, and also did not endorse any other forms of paranoia towards... Prolixn prior to offerring AVILEZ.  Conitnues to express paranoid thinking as per staff.  Risk is MODERATE to HIGH in view of paranoid thinking and recent dyregulated mood but anticipate improvement as Prolixin is accepted.  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 to raise Prolixn.   ;;: doesn't want Gordon assuring the writer that she will keet a medication box and will take her meds; not attending group lying in bed; good eye contact; adls improving; no s/h i/i/p denies avh at this time but somewhat internally preoccued;.  risk is now MODERATE as mood improves and psychotic sxs lessen.  Prolixin to be titrated upwards.
45 yo  patient, domiciled, employed, with past psychiatric history f schizophrenia who was transferred from [facility] to [facility] after she presented there in a state of agitated frenzy shouting that Kendrick and Linda had communicated with her about the impending destruction of the earth, the patient was also intensely delusional and had mentioned that her mother had been transformed into Satan, ; The patient was seen this morning, along with the student-doctor, the patient was initially found to be playful and overtly pleasant, "oh, come on in", also was very elaborate about her family, mentioned that she lived with her father, step mother, brother and 2 daughters, the patient was also quite excited about getting remarried to a person named "Mathieu" who lived in [place], " we had  in the streets, at first,but right now I am going to be  properly". The patient got agitated when the reason for the admission was enquired, initially she mentioned that it was a misunderstanding, but later mentioned that it was because of her agitation at home. 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 however she denied any auditory hallucinations/other perceptible phenomenon, and also did not endorse any other forms of paranoia towards...ansition fo after care;  ;;:; similiar presentation to ; aware of need to wait for support network at home as father has an operation this week.   sitting in bed; less constricted; good eye contact; speech cleaer; no spontaneous mention of parnaoid thinking; no s/h i/i/p or avh;  MODERATE risk with psychosis treated.  Has supportive family.  ;;:; pleasant cheerful and sitll strange ; smiles a bit but imporverished thinking; anxious but not excessively; good adls;  antiicpates returning home soon;  no s/h i/i/p or avh; Risk unchanged.  ;;:; "They say I am going home on Monday";  anxious but also smiles appropriately; better eye contact; still refuses AVILEZ; speech clear; no tremor; no mention of avh or s/h i/i/p;  risk unchaged (see );  anticipate dc on  . ;;:; much like yesterday ; smiling strangely in bed; anticipating leaving on Monday;  Risk MODERATE because of psychosis; Violence risk LOW.  fair adls; good eye contact; speech clear; guarded.
43 yo  patient, domiciled, employed, with past psychiatric history f schizophrenia who was transferred from [facility] to [facility] after she presented there in a state of agitated frenzy shouting that Kendrick and Linda had communicated with her about the impending destruction of the earth, the patient was also intensely delusional and had mentioned that her mother had been transformed into Satan, ; The patient was seen this morning, along with the student-doctor, the patient was initially found to be playful and overtly pleasant, "oh, come on in", also was very elaborate about her family, mentioned that she lived with her father, step mother, brother and 2 daughters, the patient was also quite excited about getting remarried to a person named "Mathieu" who lived in [place], " we had  in the streets, at first,but right now I am going to be  properly". The patient got agitated when the reason for the admission was enquired, initially she mentioned that it was a misunderstanding, but later mentioned that it was because of her agitation at home. 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 however she denied any auditory hallucinations/other perceptible phenomenon, and also did not endorse any other forms of paranoia towards... night.  ;;:; much like ; pleasant ; attends some groups; Continue Prolixn titration to 20mg a day for psychosis.   ;;:; feels better; still stays in bed and is somewhat guarded but pleasant; good adls; anticipates meeting with her mother in anticipation of discharge soon; however continues to reject Gordon; risk is MODERATE in view of improved mood.  ;;:; anticipates meeting with mother this afternoon; noted by staff to be avoidant but patient states she attends selective groups; denies s/h i/i/p or avh; but appears internally preocucpied but less labile; less suspicious; avoids eye contact; good adls.  risk MODERATE as mood has improved.  ;;:; good eye contact; speech cleaer; no spontaneous mention of parnaoid thinking; no s/h i/i/p or avh;   states that she is going home in a couple of days;  disputes that family does not want her home;  compliant with Prolixn .  Risk MODERATE in view of improved mood;  continue to make efforts to transition fo after care;
43 yo  patient, domiciled, employed, with past psychiatric history f schizophrenia who was [place] [facility] after she presented there in a state of agitated frenzy shouting that Kendrick and Linda had communicated with her about the impending destruction of the earth, the patient was also intensely delusional and had mentioned that her mother had been transformed into Satan, ; The patient was seen this morning, along with the student-doctor, the patient was initially found to be playful and overtly pleasant, "oh, come on in", also was very elaborate about her family, mentioned that she lived with her father, step mother, brother and 2 daughters, the patient was also quite excited about getting remarried to a person named "Mathieu" who lived in [place], " we had  in the streets, at first,but right now I am going to be  properly". The patient got agitated when the reason for the admission was enquired, initially she mentioned that it was a misunderstanding, but later mentioned that it was because of her agitation at home. 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 however she denied any auditory hallucinations/other perceptible phenomenon, and also did not endorse any other forms of paranoia towards...llege so  she might find another job ; curent works in [employer] at fast [employer]; good adls; better eye contact; still superificial but not as labile or irritable;  Risk is now moderate as patient becomes more organized on current medication regime. to raise Prolixn.   ;;: doesn't want Gordon assuring the writer that she will keet a medication box and will take her meds; not attending group lying in bed; good eye contact; adls improving; no s/h i/i/p denies avh at this time but somewhat internally preoccued;.  risk is now MODERATE as mood improves and psychotic sxs lessen.  Prolixin to be titrated upwards.  ;;:; pleasant but avoidant in bed; minimal eye contact; still does not want AVILEZ; denies s/h i/i/p or avh; speech soft and lilttle ;slightly blocked   risk is now MODERATE as mood improves and psychotic sxs lessen.  Prolixin titrated upwards to 15mg at night.  ;;:; much like ; pleasant ; attends some groups; Continue Prolixn titration to 20mg a day for psychosis.
45 yo  patient, domiciled, employed, with past psychiatric history f schizophrenia who was transferred from [facility] to [facility] after she presented there in a state of agitated frenzy shouting that Kendrick and Linda had communicated with her about the impending destruction of the earth, the patient was also intensely delusional and had mentioned that her mother had been transformed into Satan, ; The patient was seen this morning, along with the student-doctor, the patient was initially found to be playful and overtly pleasant, "oh, come on in", also was very elaborate about her family, mentioned that she lived with her father, step mother, brother and 2 daughters, the patient was also quite excited about getting remarried to a person named "Mathieu" who lived in [place], " we had  in the streets, at first,but right now I am going to be  properly". The patient got agitated when the reason for the admission was enquired, initially she mentioned that it was a misunderstanding, but later mentioned that it was because of her agitation at home. 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 however she denied any auditory hallucinations/other perceptible phenomenon, and also did not endorse any other forms of paranoia towards... regime. to raise Prolixn.   ;;: doesn't want Gordon assuring the writer that she will keet a medication box and will take her meds; not attending group lying in bed; good eye contact; adls improving; no s/h i/i/p denies avh at this time but somewhat internally preoccued;.  risk is now MODERATE as mood improves and psychotic sxs lessen.  Prolixin to be titrated upwards.  ;;:; pleasant but avoidant in bed; minimal eye contact; still does not want AVILEZ; denies s/h i/i/p or avh; speech soft and lilttle ;slightly blocked   risk is now MODERATE as mood improves and psychotic sxs lessen.  Prolixin titrated upwards to 15mg at night.  ;;:; much like ; pleasant ; attends some groups; Continue Prolixn titration to 20mg a day for psychosis.   ;;:; feels better; still stays in bed and is somewhat guarded but pleasant; good adls; anticipates meeting with her mother in anticipation of discharge soon; however continues to reject Gordon; risk is MODERATE in view of improved mood.
43 yo  patient, domiciled, employed, with past psychiatric history f schizophrenia who was transferred from [facility] to [facility] after she presented there in a state of agitated frenzy shouting that Kendrick and Linda had communicated with her about the impending destruction of the earth, the patient was also intensely delusional and had mentioned that her mother had been transformed into Satan, ; The patient was seen this morning, along with the student-doctor, the patient was initially found to be playful and overtly pleasant, "oh, come on in", also was very elaborate about her family, mentioned that she lived with her father, step mother, brother and 2 daughters, the patient was also quite excited about getting remarried to a person named "Mathieu" who lived in [place], " we had  in the streets, at first,but right now I am going to be  properly". The patient got agitated when the reason for the admission was enquired, initially she mentioned that it was a misunderstanding, but later mentioned that it was because of her agitation at home. 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

## 2019-12-23 NOTE — PROGRESS NOTE BEHAVIORAL HEALTH - NS ED BHA MSE GENERAL APPEARANCE
Well developed

## 2019-12-23 NOTE — PROGRESS NOTE BEHAVIORAL HEALTH - NSBHADMITIPOBSFT_PSY_A_CORE
makes needs known

## 2019-12-23 NOTE — PROGRESS NOTE BEHAVIORAL HEALTH - NSBHATTESTSEENBY_PSY_A_CORE
Attending Psychiatrist supervising NP/Trainee, meeting pt...
attending Psychiatrist without NP/Trainee
Attending Psychiatrist supervising NP/Trainee, meeting pt...

## 2019-12-23 NOTE — PROGRESS NOTE BEHAVIORAL HEALTH - PRIMARY DX
Paranoid schizophrenia

## 2019-12-23 NOTE — PROGRESS NOTE BEHAVIORAL HEALTH - SECONDARY DX1
Schizoaffective disorder, bipolar type

## 2019-12-23 NOTE — PROGRESS NOTE BEHAVIORAL HEALTH - THOUGHT PROCESS
Linear

## 2019-12-23 NOTE — PROGRESS NOTE BEHAVIORAL HEALTH - NSBHADMITIPBHPROVIDER_PSY_A_CORE
N/A/voice mail left
voice mail left/N/A
N/A/voice mail left
voice mail left/N/A
N/A/voice mail left
N/A/voice mail left
voice mail left/N/A

## 2019-12-26 DIAGNOSIS — Z91.14 PATIENT'S OTHER NONCOMPLIANCE WITH MEDICATION REGIMEN: ICD-10-CM

## 2019-12-26 DIAGNOSIS — F20.0 PARANOID SCHIZOPHRENIA: ICD-10-CM

## 2019-12-26 DIAGNOSIS — F25.0 SCHIZOAFFECTIVE DISORDER, BIPOLAR TYPE: ICD-10-CM

## 2019-12-26 DIAGNOSIS — E11.9 TYPE 2 DIABETES MELLITUS WITHOUT COMPLICATIONS: ICD-10-CM

## 2020-05-28 ENCOUNTER — APPOINTMENT (OUTPATIENT)
Dept: OBGYN | Facility: CLINIC | Age: 45
End: 2020-05-28
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
## 2021-06-24 ENCOUNTER — EMERGENCY (EMERGENCY)
Facility: HOSPITAL | Age: 46
LOS: 1 days | End: 2021-06-24
Admitting: EMERGENCY MEDICINE
Payer: MEDICAID

## 2021-06-24 PROCEDURE — 99285 EMERGENCY DEPT VISIT HI MDM: CPT

## 2021-06-24 PROCEDURE — 71045 X-RAY EXAM CHEST 1 VIEW: CPT | Mod: 26

## 2021-06-24 PROCEDURE — 93010 ELECTROCARDIOGRAM REPORT: CPT | Mod: 77

## 2021-06-24 PROCEDURE — 93010 ELECTROCARDIOGRAM REPORT: CPT

## 2021-06-25 ENCOUNTER — INPATIENT (INPATIENT)
Facility: HOSPITAL | Age: 46
LOS: 12 days | Discharge: HOME | End: 2021-07-08
Attending: PSYCHIATRY & NEUROLOGY | Admitting: PSYCHIATRY & NEUROLOGY
Payer: MEDICAID

## 2021-06-25 VITALS — WEIGHT: 230.38 LBS | TEMPERATURE: 98 F | RESPIRATION RATE: 17 BRPM | OXYGEN SATURATION: 98 % | HEIGHT: 62 IN

## 2021-06-25 DIAGNOSIS — F33.9 MAJOR DEPRESSIVE DISORDER, RECURRENT, UNSPECIFIED: ICD-10-CM

## 2021-06-25 PROCEDURE — 99222 1ST HOSP IP/OBS MODERATE 55: CPT

## 2021-06-25 RX ORDER — CLONAZEPAM 1 MG
1 TABLET ORAL THREE TIMES A DAY
Refills: 0 | Status: DISCONTINUED | OUTPATIENT
Start: 2021-06-25 | End: 2021-06-28

## 2021-06-25 RX ORDER — HALOPERIDOL DECANOATE 100 MG/ML
5 INJECTION INTRAMUSCULAR EVERY 6 HOURS
Refills: 0 | Status: DISCONTINUED | OUTPATIENT
Start: 2021-06-25 | End: 2021-06-25

## 2021-06-25 RX ORDER — RISPERIDONE 4 MG/1
1 TABLET ORAL
Refills: 0 | Status: DISCONTINUED | OUTPATIENT
Start: 2021-06-25 | End: 2021-06-27

## 2021-06-25 RX ORDER — DEXTROSE 50 % IN WATER 50 %
12.5 SYRINGE (ML) INTRAVENOUS ONCE
Refills: 0 | Status: DISCONTINUED | OUTPATIENT
Start: 2021-06-25 | End: 2021-07-08

## 2021-06-25 RX ORDER — BENZTROPINE MESYLATE 1 MG
0.5 TABLET ORAL
Refills: 0 | Status: DISCONTINUED | OUTPATIENT
Start: 2021-06-25 | End: 2021-06-30

## 2021-06-25 RX ORDER — DEXTROSE 50 % IN WATER 50 %
15 SYRINGE (ML) INTRAVENOUS ONCE
Refills: 0 | Status: DISCONTINUED | OUTPATIENT
Start: 2021-06-25 | End: 2021-07-08

## 2021-06-25 RX ORDER — SODIUM CHLORIDE 9 MG/ML
1000 INJECTION, SOLUTION INTRAVENOUS
Refills: 0 | Status: DISCONTINUED | OUTPATIENT
Start: 2021-06-25 | End: 2021-07-08

## 2021-06-25 RX ORDER — DEXTROSE 50 % IN WATER 50 %
25 SYRINGE (ML) INTRAVENOUS ONCE
Refills: 0 | Status: DISCONTINUED | OUTPATIENT
Start: 2021-06-25 | End: 2021-07-08

## 2021-06-25 RX ORDER — GLUCAGON INJECTION, SOLUTION 0.5 MG/.1ML
1 INJECTION, SOLUTION SUBCUTANEOUS ONCE
Refills: 0 | Status: DISCONTINUED | OUTPATIENT
Start: 2021-06-25 | End: 2021-07-08

## 2021-06-25 RX ORDER — HALOPERIDOL DECANOATE 100 MG/ML
5 INJECTION INTRAMUSCULAR EVERY 6 HOURS
Refills: 0 | Status: DISCONTINUED | OUTPATIENT
Start: 2021-06-25 | End: 2021-07-08

## 2021-06-25 RX ORDER — DIPHENHYDRAMINE HCL 50 MG
50 CAPSULE ORAL EVERY 6 HOURS
Refills: 0 | Status: DISCONTINUED | OUTPATIENT
Start: 2021-06-25 | End: 2021-07-08

## 2021-06-25 RX ORDER — DIPHENHYDRAMINE HCL 50 MG
50 CAPSULE ORAL EVERY 6 HOURS
Refills: 0 | Status: DISCONTINUED | OUTPATIENT
Start: 2021-06-25 | End: 2021-06-25

## 2021-06-25 RX ADMIN — Medication 50 MILLIGRAM(S): at 21:40

## 2021-06-25 RX ADMIN — Medication 1 MILLIGRAM(S): at 21:40

## 2021-06-25 RX ADMIN — Medication 0.5 MILLIGRAM(S): at 21:40

## 2021-06-25 RX ADMIN — RISPERIDONE 1 MILLIGRAM(S): 4 TABLET ORAL at 21:40
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
## 2021-06-25 NOTE — BH PATIENT PROFILE - HOME MEDICATIONS
fluPHENAZine 10 mg oral tablet , 3 tab(s) orally once a day (at bedtime)  benztropine 0.5 mg oral tablet , 1 tab(s) orally once a day, As needed, Extrapyramidal symptoms  pantoprazole 40 mg oral delayed release tablet , 1 tab(s) orally once a day (before a meal)  metFORMIN 1000 mg oral tablet , 1 tab(s) orally once a day  lisinopril 2.5 mg oral tablet , 1 tab(s) orally once a day

## 2021-06-25 NOTE — BH INPATIENT PSYCHIATRY ASSESSMENT NOTE - NSBHCHARTREVIEWVS_PSY_A_CORE FT
Vital Signs Last 24 Hrs  T(C): 35.9 (25 Jun 2021 14:41), Max: 36.8 (25 Jun 2021 11:30)  T(F): 96.7 (25 Jun 2021 14:41), Max: 98.2 (25 Jun 2021 11:30)  HR: --108  BP: --148/84  BP(mean): --  RR: 17 (25 Jun 2021 11:30) (17 - 17)  SpO2: 98% (25 Jun 2021 11:30) (98% - 98%)
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
## 2021-06-25 NOTE — CHART NOTE - NSCHARTNOTEFT_GEN_A_CORE
Screening Medical Evaluation  Patient Admitted from: [facility] admitting diagnosis: Recurrent major depressive disorder    PAST MEDICAL & SURGICAL HISTORY:  Diabetes mellitus    No significant past surgical history          Allergies    No Known Allergies    Intolerances        Social History:     FAMILY HISTORY:      MEDICATIONS  (STANDING):  benztropine 0.5 milliGRAM(s) Oral two times a day  clonazePAM  Tablet 1 milliGRAM(s) Oral three times a day  dextrose 40% Gel 15 Gram(s) Oral once  dextrose 5%. 1000 milliLiter(s) (50 mL/Hr) IV Continuous <Continuous>  dextrose 5%. 1000 milliLiter(s) (100 mL/Hr) IV Continuous <Continuous>  dextrose 50% Injectable 25 Gram(s) IV Push once  dextrose 50% Injectable 12.5 Gram(s) IV Push once  dextrose 50% Injectable 25 Gram(s) IV Push once  glucagon  Injectable 1 milliGRAM(s) IntraMuscular once  risperiDONE   Tablet 1 milliGRAM(s) Oral two times a day    MEDICATIONS  (PRN):  diphenhydrAMINE 50 milliGRAM(s) Oral every 6 hours PRN agitation  diphenhydrAMINE   Injectable 50 milliGRAM(s) IntraMuscular every 6 hours PRN Agitation  haloperidol     Tablet 5 milliGRAM(s) Oral every 6 hours PRN agitation  haloperidol    Injectable 5 milliGRAM(s) IntraMuscular every 6 hours PRN Agitation  LORazepam     Tablet 2 milliGRAM(s) Oral every 6 hours PRN agitation  LORazepam   Injectable 2 milliGRAM(s) IntraMuscular every 4 hours PRN Agitation      Vital Signs Last 24 Hrs  T(C): 36.5 (25 Jun 2021 19:55), Max: 36.8 (25 Jun 2021 11:30)  T(F): 97.7 (25 Jun 2021 19:55), Max: 98.2 (25 Jun 2021 11:30)  HR: 100 (25 Jun 2021 19:55) (100 - 100)  BP: 139/82 (25 Jun 2021 19:55) (139/82 - 139/82)  BP(mean): --  RR: 17 (25 Jun 2021 11:30) (17 - 17)  SpO2: 98% (25 Jun 2021 11:30) (98% - 98%)  CAPILLARY BLOOD GLUCOSE            PHYSICAL EXAM:  GENERAL: NAD, well-developed  HEAD:  Atraumatic, Normocephalic  EYES: EOMI, PERRLA, conjunctiva and sclera clear  NECK: Supple, No JVD  CHEST/LUNG: Clear to auscultation bilaterally; No wheeze  HEART: Regular rate and rhythm; No murmurs, rubs, or gallops  ABDOMEN: Soft, Nontender, Nondistended; Bowel sounds present  EXTREMITIES:  2+ Peripheral Pulses, No clubbing, cyanosis, or edema  PSYCH: AAOx3  NEUROLOGY: non-focal  SKIN: No rashes or lesions    LABS:            RADIOLOGY & ADDITIONAL TESTS:    Assessment and Plan:  45 year old female with PMHx of Diabetes presents to [facility] with an admitting diagnosis of Recurrent major depressive disorder. Pt has no medical complaints at this time including fevers, headache, dizziness, changes in vision, chest pain, SOB, abdominal pain, N/V/D/C, dysuria.      1. Recurrent major depressive disorder- follow plan per psychiatric team    2. Diabetes- A1c ordered for AM, FS before meals and bedtime

## 2021-06-25 NOTE — BH INPATIENT PSYCHIATRY ASSESSMENT NOTE - NSBHCRANIAL_PSY_ALL_CORE
Recognizes 2 fingers or can read (II)/Normal speech (IX, X, XII)/EOMI (III, IV, VI)/Shoulder shrug (XI)/Hearing intact (VIII)

## 2021-06-25 NOTE — BH INPATIENT PSYCHIATRY ASSESSMENT NOTE - CURRENT MEDICATION
MEDICATIONS  (STANDING):    MEDICATIONS  (PRN):  diphenhydrAMINE 50 milliGRAM(s) Oral every 6 hours PRN agitation  diphenhydrAMINE   Injectable 50 milliGRAM(s) IntraMuscular every 6 hours PRN Agitation  haloperidol     Tablet 5 milliGRAM(s) Oral every 6 hours PRN agitation  haloperidol    Injectable 5 milliGRAM(s) IntraMuscular every 6 hours PRN Agitation  LORazepam     Tablet 2 milliGRAM(s) Oral every 6 hours PRN agitation  LORazepam   Injectable 2 milliGRAM(s) IntraMuscular every 4 hours PRN Agitation

## 2021-06-25 NOTE — BH INPATIENT PSYCHIATRY ASSESSMENT NOTE - RISK ASSESSMENT
Patient at risk for aggression due to decompensation of symptoms. Denies current / past SI/ HI intent and plan.

## 2021-06-25 NOTE — BH INPATIENT PSYCHIATRY ASSESSMENT NOTE - OTHER PAST PSYCHIATRIC HISTORY (INCLUDE DETAILS REGARDING ONSET, COURSE OF ILLNESS, INPATIENT/OUTPATIENT TREATMENT)
non-compliant with treatment for the past 2 years and had multiple psychiatric hospitalizations with most recent at [facility] for 1.5 weeks in 6/21. patient has outpatient treatment at [facility] in [place]. [facility] is trying to facility the patient with an ACT team recently

## 2021-06-26 LAB
A1C WITH ESTIMATED AVERAGE GLUCOSE RESULT: 7.5 % — HIGH (ref 4–5.6)
ALBUMIN SERPL ELPH-MCNC: 4 G/DL — SIGNIFICANT CHANGE UP (ref 3.3–5)
ALP SERPL-CCNC: 86 U/L — SIGNIFICANT CHANGE UP (ref 40–120)
ALT FLD-CCNC: 10 U/L — SIGNIFICANT CHANGE UP (ref 4–33)
ANION GAP SERPL CALC-SCNC: 12 MMOL/L — SIGNIFICANT CHANGE UP (ref 7–14)
ANISOCYTOSIS BLD QL: SLIGHT — SIGNIFICANT CHANGE UP
AST SERPL-CCNC: 7 U/L — SIGNIFICANT CHANGE UP (ref 4–32)
BASOPHILS # BLD AUTO: 0 K/UL — SIGNIFICANT CHANGE UP (ref 0–0.2)
BASOPHILS NFR BLD AUTO: 0 % — SIGNIFICANT CHANGE UP (ref 0–2)
BILIRUB SERPL-MCNC: <0.2 MG/DL — SIGNIFICANT CHANGE UP (ref 0.2–1.2)
BUN SERPL-MCNC: 11 MG/DL — SIGNIFICANT CHANGE UP (ref 7–23)
CALCIUM SERPL-MCNC: 9.2 MG/DL — SIGNIFICANT CHANGE UP (ref 8.4–10.5)
CHLORIDE SERPL-SCNC: 103 MMOL/L — SIGNIFICANT CHANGE UP (ref 98–107)
CHOLEST SERPL-MCNC: 149 MG/DL — SIGNIFICANT CHANGE UP
CO2 SERPL-SCNC: 21 MMOL/L — LOW (ref 22–31)
CREAT SERPL-MCNC: 0.55 MG/DL — SIGNIFICANT CHANGE UP (ref 0.5–1.3)
EOSINOPHIL # BLD AUTO: 0.31 K/UL — SIGNIFICANT CHANGE UP (ref 0–0.5)
EOSINOPHIL NFR BLD AUTO: 2.7 % — SIGNIFICANT CHANGE UP (ref 0–6)
ESTIMATED AVERAGE GLUCOSE: 169 MG/DL — HIGH (ref 68–114)
GIANT PLATELETS BLD QL SMEAR: PRESENT — SIGNIFICANT CHANGE UP
GLUCOSE BLDC GLUCOMTR-MCNC: 173 MG/DL — HIGH (ref 70–99)
GLUCOSE SERPL-MCNC: 251 MG/DL — HIGH (ref 70–99)
HCT VFR BLD CALC: 34.6 % — SIGNIFICANT CHANGE UP (ref 34.5–45)
HDLC SERPL-MCNC: 43 MG/DL — LOW
HGB BLD-MCNC: 10.5 G/DL — LOW (ref 11.5–15.5)
IANC: 8.92 K/UL — HIGH (ref 1.5–8.5)
LIPID PNL WITH DIRECT LDL SERPL: 69 MG/DL — SIGNIFICANT CHANGE UP
LYMPHOCYTES # BLD AUTO: 1.35 K/UL — SIGNIFICANT CHANGE UP (ref 1–3.3)
LYMPHOCYTES # BLD AUTO: 11.6 % — LOW (ref 13–44)
MANUAL SMEAR VERIFICATION: SIGNIFICANT CHANGE UP
MCHC RBC-ENTMCNC: 22.1 PG — LOW (ref 27–34)
MCHC RBC-ENTMCNC: 30.3 GM/DL — LOW (ref 32–36)
MCV RBC AUTO: 72.8 FL — LOW (ref 80–100)
MICROCYTES BLD QL: SLIGHT — SIGNIFICANT CHANGE UP
MONOCYTES # BLD AUTO: 0.1 K/UL — SIGNIFICANT CHANGE UP (ref 0–0.9)
MONOCYTES NFR BLD AUTO: 0.9 % — LOW (ref 2–14)
NEUTROPHILS # BLD AUTO: 9.85 K/UL — HIGH (ref 1.8–7.4)
NEUTROPHILS NFR BLD AUTO: 84.8 % — HIGH (ref 43–77)
NON HDL CHOLESTEROL: 106 MG/DL — SIGNIFICANT CHANGE UP
OVALOCYTES BLD QL SMEAR: SLIGHT — SIGNIFICANT CHANGE UP
PLAT MORPH BLD: NORMAL — SIGNIFICANT CHANGE UP
PLATELET # BLD AUTO: 391 K/UL — SIGNIFICANT CHANGE UP (ref 150–400)
PLATELET COUNT - ESTIMATE: NORMAL — SIGNIFICANT CHANGE UP
POIKILOCYTOSIS BLD QL AUTO: SLIGHT — SIGNIFICANT CHANGE UP
POLYCHROMASIA BLD QL SMEAR: SLIGHT — SIGNIFICANT CHANGE UP
POTASSIUM SERPL-MCNC: 4.1 MMOL/L — SIGNIFICANT CHANGE UP (ref 3.5–5.3)
POTASSIUM SERPL-SCNC: 4.1 MMOL/L — SIGNIFICANT CHANGE UP (ref 3.5–5.3)
PROT SERPL-MCNC: 7.1 G/DL — SIGNIFICANT CHANGE UP (ref 6–8.3)
RBC # BLD: 4.75 M/UL — SIGNIFICANT CHANGE UP (ref 3.8–5.2)
RBC # FLD: 17.3 % — HIGH (ref 10.3–14.5)
RBC BLD AUTO: ABNORMAL
SODIUM SERPL-SCNC: 136 MMOL/L — SIGNIFICANT CHANGE UP (ref 135–145)
TRIGL SERPL-MCNC: 184 MG/DL — HIGH
WBC # BLD: 11.61 K/UL — HIGH (ref 3.8–10.5)
WBC # FLD AUTO: 11.61 K/UL — HIGH (ref 3.8–10.5)

## 2021-06-26 PROCEDURE — 99232 SBSQ HOSP IP/OBS MODERATE 35: CPT

## 2021-06-26 RX ADMIN — RISPERIDONE 1 MILLIGRAM(S): 4 TABLET ORAL at 20:41

## 2021-06-26 RX ADMIN — Medication 1 MILLIGRAM(S): at 08:30

## 2021-06-26 RX ADMIN — Medication 0.5 MILLIGRAM(S): at 20:41

## 2021-06-26 RX ADMIN — Medication 1 MILLIGRAM(S): at 20:41

## 2021-06-26 RX ADMIN — Medication 0.5 MILLIGRAM(S): at 08:30

## 2021-06-26 RX ADMIN — RISPERIDONE 1 MILLIGRAM(S): 4 TABLET ORAL at 08:29

## 2021-06-26 RX ADMIN — Medication 1 MILLIGRAM(S): at 14:59

## 2021-06-26 NOTE — BH INPATIENT PSYCHIATRY PROGRESS NOTE - NSBHFUPINTERVALHXFT_PSY_A_CORE
Patient is followed up for Bipolar Disorder, admitted for psychotic decompensation.  Chart, medications and labs reviewed.  Patient is discussed with nursing staff. No significant overnight issues.  Patient is observed in her room.  Patient is notably guarded, paranoid upon approach.  Discussed precipitating events leading to current admission and why she is here pt replies “I was dealing with so much”  During interview patient appears  internally preoccupied, bizzare, hyperverbal, delusional. Patient eliciting in psychotic accusations, that staff is trying to harm her and kidnap her.    Pt reports “I’m Rodríguez I picked that name.” Interview was challenging due to patient’s profound disorganization and paranoid delusions, and ongoing perceptual disturbances. At time of interview patient denies SI/SIB/HI, denies all psychotic features, despite psychotic symptoms successfully elicited.  No mention of sleep disturbances “I slept good finally” no appetite concerns.  Remains compliant with medications, no reported or observed adverse effects.  No akithisia, EPS, or tremors. The need for medication compliance is emphasized.  Self- care remains fair.  Overall in good behavioral control. No prn’s given.  No acute medical concerns. VSS. Pt offers no complaints. Continue to provide therapeutic support.

## 2021-06-26 NOTE — PSYCHIATRIC REHAB INITIAL EVALUATION - NSBHPRRECOMMEND_PSY_ALL_CORE
Writer met with patient in orient patient to unit and briefly introduce patient to psychiatric rehabilitation staff and department function. Patient was provided with a copy of unit schedule. Patient is not a reliable historian, as patient is currently psychotic and delusional; therefore, the following information will be obtained from chart. Chart reports patient is currently admitted due to psychotic decompensation in the context of medication non-compliance. Patient endorses mulitple delusions, such as believing she has 24 children and that other patients on unit are her family.  Patient is also paranoid, stating that she believes her parents are trying to kill her. Writer and patient established a collaborative psychiatric rehabilitation goal. Writer encouraged patient to attend psychiatric rehabilitation groups and engage in treatment. Psychiatric rehabilitation staff will engage patient daily.

## 2021-06-27 LAB
GLUCOSE BLDC GLUCOMTR-MCNC: 194 MG/DL — HIGH (ref 70–99)
GLUCOSE BLDC GLUCOMTR-MCNC: 201 MG/DL — HIGH (ref 70–99)
GLUCOSE BLDC GLUCOMTR-MCNC: 263 MG/DL — HIGH (ref 70–99)
GLUCOSE BLDC GLUCOMTR-MCNC: 294 MG/DL — HIGH (ref 70–99)

## 2021-06-27 PROCEDURE — 99232 SBSQ HOSP IP/OBS MODERATE 35: CPT

## 2021-06-27 PROCEDURE — 99222 1ST HOSP IP/OBS MODERATE 55: CPT

## 2021-06-27 RX ORDER — RISPERIDONE 4 MG/1
2 TABLET ORAL AT BEDTIME
Refills: 0 | Status: DISCONTINUED | OUTPATIENT
Start: 2021-06-27 | End: 2021-06-28

## 2021-06-27 RX ORDER — INSULIN LISPRO 100/ML
VIAL (ML) SUBCUTANEOUS
Refills: 0 | Status: DISCONTINUED | OUTPATIENT
Start: 2021-06-27 | End: 2021-07-08

## 2021-06-27 RX ORDER — METFORMIN HYDROCHLORIDE 850 MG/1
1000 TABLET ORAL
Refills: 0 | Status: DISCONTINUED | OUTPATIENT
Start: 2021-06-27 | End: 2021-07-08

## 2021-06-27 RX ORDER — INSULIN LISPRO 100/ML
2 VIAL (ML) SUBCUTANEOUS ONCE
Refills: 0 | Status: COMPLETED | OUTPATIENT
Start: 2021-06-27 | End: 2021-06-27

## 2021-06-27 RX ADMIN — Medication 0.5 MILLIGRAM(S): at 21:00

## 2021-06-27 RX ADMIN — Medication 3: at 11:57

## 2021-06-27 RX ADMIN — Medication 2 UNIT(S): at 08:22

## 2021-06-27 RX ADMIN — Medication 1: at 16:46

## 2021-06-27 RX ADMIN — RISPERIDONE 2 MILLIGRAM(S): 4 TABLET ORAL at 21:00

## 2021-06-27 RX ADMIN — METFORMIN HYDROCHLORIDE 1000 MILLIGRAM(S): 850 TABLET ORAL at 20:59

## 2021-06-27 RX ADMIN — Medication 1 MILLIGRAM(S): at 12:22

## 2021-06-27 RX ADMIN — Medication 1 MILLIGRAM(S): at 08:27

## 2021-06-27 RX ADMIN — Medication 1 MILLIGRAM(S): at 20:59

## 2021-06-27 RX ADMIN — Medication 0.5 MILLIGRAM(S): at 08:27

## 2021-06-27 NOTE — CONSULT NOTE ADULT - SUBJECTIVE AND OBJECTIVE BOX
45 F with pmhx of DM2 currently admitted to [facility] called to evaluate for DM2 and Fs control. pt states that she is a known diabetic and that she was on metformin 1000mg po bid, glipizide, 10mg daily and recently on Trulicity 3.0 weekly as well as Invokana 150mg daily. since being at [facility] she has ot received any diabetes medication. she was just started on sliding scale overnight. her FS are 173 and 201 - denies hx of DKA or HHS, hypoglycemia, compliant with diet. denies CAD, HTN, HLD. denies neuropathy, retinopathy, or LE wounds - radha po well, wo n/v/d     Allergies: NKDA    PMHX; per hpi  Social: denies  FHX: NC    Patient is a 45y old  Female who presents with a chief complaint of     SUBJECTIVE / OVERNIGHT EVENTS:    ROS:  No HA/DZ  No Vision changes   No CP, SOB  No N/V/D  No Edema  No Rash  NO weakness, numbness    MEDICATIONS  (STANDING):  benztropine 0.5 milliGRAM(s) Oral two times a day  clonazePAM  Tablet 1 milliGRAM(s) Oral three times a day  dextrose 40% Gel 15 Gram(s) Oral once  dextrose 5%. 1000 milliLiter(s) (50 mL/Hr) IV Continuous <Continuous>  dextrose 5%. 1000 milliLiter(s) (100 mL/Hr) IV Continuous <Continuous>  dextrose 50% Injectable 25 Gram(s) IV Push once  dextrose 50% Injectable 12.5 Gram(s) IV Push once  dextrose 50% Injectable 25 Gram(s) IV Push once  glucagon  Injectable 1 milliGRAM(s) IntraMuscular once  insulin lispro (ADMELOG) corrective regimen sliding scale   SubCutaneous three times a day before meals  risperiDONE   Tablet 2 milliGRAM(s) Oral at bedtime    MEDICATIONS  (PRN):  diphenhydrAMINE 50 milliGRAM(s) Oral every 6 hours PRN agitation  diphenhydrAMINE   Injectable 50 milliGRAM(s) IntraMuscular every 6 hours PRN Agitation  haloperidol     Tablet 5 milliGRAM(s) Oral every 6 hours PRN agitation  haloperidol    Injectable 5 milliGRAM(s) IntraMuscular every 6 hours PRN Agitation  LORazepam     Tablet 2 milliGRAM(s) Oral every 6 hours PRN agitation  LORazepam   Injectable 2 milliGRAM(s) IntraMuscular every 4 hours PRN Agitation      T(C): 36.4 (06-27-21 @ 06:30)  HR: --97  BP: --131/83  RR: --12  SpO2: --  CAPILLARY BLOOD GLUCOSE      POCT Blood Glucose.: 201 mg/dL (27 Jun 2021 07:36)  POCT Blood Glucose.: 173 mg/dL (26 Jun 2021 20:13)    I&O's Summary      PHYSICAL EXAM:  GENERAL: NAD, well-developed, AOx3  HEAD:  Atraumatic, Normocephalic  EYES: EOMI, PERRL, conjunctiva and sclera clear  NECK: Supple, No JVD  CHEST/LUNG: Clear to auscultation bilaterally  HEART: Regular rate and rhythm; No murmurs, rubs, or gallops, No Edema  ABDOMEN: Soft, Nontender, Nondistended; Bowel sounds present  EXTREMITIES:  2+ Peripheral Pulses, No clubbing, cyanosis  PSYCH: No SI/HI  NEUROLOGY: non-focal  SKIN: No rashes or lesions    LABS:                        10.5   11.61 )-----------( 391      ( 26 Jun 2021 10:44 )             34.6     06-26    136  |  103  |  11  ----------------------------<  251<H>  4.1   |  21<L>  |  0.55    Ca    9.2      26 Jun 2021 10:44    TPro  7.1  /  Alb  4.0  /  TBili  <0.2  /  DBili  x   /  AST  7   /  ALT  10  /  AlkPhos  86  06-26                  RADIOLOGY & ADDITIONAL TESTS:    Imaging Personally Reviewed:    Consultant(s) Notes Reviewed:      Care Discussed with Consultants/Other Providers:

## 2021-06-27 NOTE — CONSULT NOTE ADULT - ASSESSMENT
45 F with pmhx of DM2 currently admitted to [facility] called to evaluate for DM2 and Fs control.    1- DM2 - a1c 7.5 - FS slightly above goal - at home on multiple regimens - being fs here are just above threshold, will start by resuming metformin and trend FS and reintroduce home meds as needed, cw NISS, change diet to diabetic renal fx nl    2- LDL at goal - diet control    3- BP - slightly above goal, will monitor and consider starting ace-i in setting of DM2 if needed     4- psych - per primary team - monitor qtc on psychotropics

## 2021-06-27 NOTE — BH INPATIENT PSYCHIATRY PROGRESS NOTE - NSBHFUPINTERVALHXFT_PSY_A_CORE
Patient is followed up for Bipolar Disorder, admitted for psychotic decompensation.  Chart, medications and labs reviewed.  Patient is discussed with nursing staff. No significant overnight issues.  Patient remains isolated from peers, stays mostly in her room in bed. Pt reports feeling “ok.”  Patient remains bizarre, disorganized and with ongoing perceptual disturbances. At time of interview patient denies SI/SIB/HI, denies all psychotic features, despite psychotic symptoms successfully elicited.  Good sleep and appetite. Remains compliant with medications. Patient questioned why she is taking po Risperdal, pt stated she recently received her monthly AVILEZ injection Invega 156mg from the [facility] outpatient clinic, reports last received 2 weeks ago.  Defer to primary team to verify last AVILEZ. Titrate Rispedal 2mg tonight. Self- care remains fair.  Overall in good behavioral control. No prn’s given.  No acute medical concerns. VSS. Pt offers no complaints. Continue to provide therapeutic support.

## 2021-06-28 LAB
GLUCOSE BLDC GLUCOMTR-MCNC: 237 MG/DL — HIGH (ref 70–99)
GLUCOSE BLDC GLUCOMTR-MCNC: 237 MG/DL — HIGH (ref 70–99)
GLUCOSE BLDC GLUCOMTR-MCNC: 240 MG/DL — HIGH (ref 70–99)
GLUCOSE BLDC GLUCOMTR-MCNC: 245 MG/DL — HIGH (ref 70–99)

## 2021-06-28 PROCEDURE — 99232 SBSQ HOSP IP/OBS MODERATE 35: CPT

## 2021-06-28 RX ORDER — CLONAZEPAM 1 MG
1 TABLET ORAL THREE TIMES A DAY
Refills: 0 | Status: DISCONTINUED | OUTPATIENT
Start: 2021-06-28 | End: 2021-06-29

## 2021-06-28 RX ORDER — RISPERIDONE 4 MG/1
3 TABLET ORAL AT BEDTIME
Refills: 0 | Status: DISCONTINUED | OUTPATIENT
Start: 2021-06-28 | End: 2021-06-29

## 2021-06-28 RX ORDER — LITHIUM CARBONATE 300 MG/1
300 TABLET, EXTENDED RELEASE ORAL
Refills: 0 | Status: DISCONTINUED | OUTPATIENT
Start: 2021-06-28 | End: 2021-06-29

## 2021-06-28 RX ADMIN — Medication 2: at 12:06

## 2021-06-28 RX ADMIN — RISPERIDONE 3 MILLIGRAM(S): 4 TABLET ORAL at 20:17

## 2021-06-28 RX ADMIN — Medication 2: at 08:10

## 2021-06-28 RX ADMIN — METFORMIN HYDROCHLORIDE 1000 MILLIGRAM(S): 850 TABLET ORAL at 08:10

## 2021-06-28 RX ADMIN — Medication 2: at 16:36

## 2021-06-28 RX ADMIN — Medication 0.5 MILLIGRAM(S): at 08:11

## 2021-06-28 RX ADMIN — Medication 1 MILLIGRAM(S): at 08:11

## 2021-06-28 RX ADMIN — LITHIUM CARBONATE 300 MILLIGRAM(S): 300 TABLET, EXTENDED RELEASE ORAL at 20:18

## 2021-06-28 RX ADMIN — Medication 10 MILLIGRAM(S): at 12:56

## 2021-06-28 RX ADMIN — Medication 1 MILLIGRAM(S): at 12:07

## 2021-06-28 RX ADMIN — METFORMIN HYDROCHLORIDE 1000 MILLIGRAM(S): 850 TABLET ORAL at 20:17

## 2021-06-28 RX ADMIN — Medication 0.5 MILLIGRAM(S): at 20:17

## 2021-06-28 RX ADMIN — Medication 1 MILLIGRAM(S): at 20:18
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
## 2021-06-28 NOTE — PROGRESS NOTE ADULT - SUBJECTIVE AND OBJECTIVE BOX
Patient is a 45y old  Female who presents with a chief complaint of DM2     SUBJECTIVE / OVERNIGHT EVENTS: FS remain uncontrolled - mid 200s     ROS:  No HA/DZ  No Vision changes   No CP, SOB  No N/V/D  No Edema  No Rash  NO weakness, numbness    MEDICATIONS  (STANDING):  benztropine 0.5 milliGRAM(s) Oral two times a day  clonazePAM  Tablet 1 milliGRAM(s) Oral three times a day  dextrose 40% Gel 15 Gram(s) Oral once  dextrose 5%. 1000 milliLiter(s) (50 mL/Hr) IV Continuous <Continuous>  dextrose 5%. 1000 milliLiter(s) (100 mL/Hr) IV Continuous <Continuous>  dextrose 50% Injectable 25 Gram(s) IV Push once  dextrose 50% Injectable 12.5 Gram(s) IV Push once  dextrose 50% Injectable 25 Gram(s) IV Push once  glucagon  Injectable 1 milliGRAM(s) IntraMuscular once  insulin lispro (ADMELOG) corrective regimen sliding scale   SubCutaneous three times a day before meals  metFORMIN 1000 milliGRAM(s) Oral two times a day  risperiDONE   Tablet 2 milliGRAM(s) Oral at bedtime    MEDICATIONS  (PRN):  diphenhydrAMINE 50 milliGRAM(s) Oral every 6 hours PRN agitation  diphenhydrAMINE   Injectable 50 milliGRAM(s) IntraMuscular every 6 hours PRN Agitation  haloperidol     Tablet 5 milliGRAM(s) Oral every 6 hours PRN agitation  haloperidol    Injectable 5 milliGRAM(s) IntraMuscular every 6 hours PRN Agitation  LORazepam     Tablet 2 milliGRAM(s) Oral every 6 hours PRN agitation  LORazepam   Injectable 2 milliGRAM(s) IntraMuscular every 4 hours PRN Agitation      T(C): 36.2 (06-28-21 @ 06:42)  HR: --97  BP: --119/77  RR: 18 (06-27-21 @ 22:22)  SpO2: 100% (06-27-21 @ 22:22)  CAPILLARY BLOOD GLUCOSE      POCT Blood Glucose.: 240 mg/dL (28 Jun 2021 07:34)  POCT Blood Glucose.: 263 mg/dL (27 Jun 2021 20:25)  POCT Blood Glucose.: 194 mg/dL (27 Jun 2021 16:13)  POCT Blood Glucose.: 294 mg/dL (27 Jun 2021 11:27)    I&O's Summary      PHYSICAL EXAM:  GENERAL: NAD, well-developed, AOx3  HEAD:  Atraumatic, Normocephalic  EYES: EOMI, PERRL, conjunctiva and sclera clear  NECK: Supple, No JVD  CHEST/LUNG: Clear to auscultation bilaterally  HEART: Regular rate and rhythm; No murmurs, rubs, or gallops, No Edema  ABDOMEN: Soft, Nontender, Nondistended; Bowel sounds present  EXTREMITIES:  2+ Peripheral Pulses, No clubbing, cyanosis  PSYCH: No SI/HI  NEUROLOGY: non-focal  SKIN: No rashes or lesions    LABS:                        10.5   11.61 )-----------( 391      ( 26 Jun 2021 10:44 )             34.6     06-26    136  |  103  |  11  ----------------------------<  251<H>  4.1   |  21<L>  |  0.55    Ca    9.2      26 Jun 2021 10:44    TPro  7.1  /  Alb  4.0  /  TBili  <0.2  /  DBili  x   /  AST  7   /  ALT  10  /  AlkPhos  86  06-26                  RADIOLOGY & ADDITIONAL TESTS:    Imaging Personally Reviewed:    Consultant(s) Notes Reviewed:      Care Discussed with Consultants/Other Providers:
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
## 2021-06-28 NOTE — PROGRESS NOTE ADULT - ASSESSMENT
45 F with pmhx of DM2 currently admitted to [facility] called to evaluate for DM2 and Fs control.    1- DM2 - a1c 7.5 - FS uncontrolled - at home on multiple regimens - cw metformin, add home glipizide 10mg daily, Trulicity and Invokana not available here - if remains uncontrolled, will add basal insulin -, cw NISS, changed diet to diabetic renal fx nl    2- LDL at goal - diet control    3- BP - slightly above goal, will monitor and consider starting ace-i in setting of DM2 if needed     4- psych - per primary team - monitor qtc on psychotropics
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
## 2021-06-29 LAB
ALBUMIN SERPL ELPH-MCNC: 3.7 G/DL — SIGNIFICANT CHANGE UP (ref 3.3–5)
ALP SERPL-CCNC: 81 U/L — SIGNIFICANT CHANGE UP (ref 40–120)
ALT FLD-CCNC: 12 U/L — SIGNIFICANT CHANGE UP (ref 4–33)
ANION GAP SERPL CALC-SCNC: 16 MMOL/L — HIGH (ref 7–14)
AST SERPL-CCNC: 10 U/L — SIGNIFICANT CHANGE UP (ref 4–32)
BASOPHILS # BLD AUTO: 0.03 K/UL — SIGNIFICANT CHANGE UP (ref 0–0.2)
BASOPHILS NFR BLD AUTO: 0.3 % — SIGNIFICANT CHANGE UP (ref 0–2)
BILIRUB SERPL-MCNC: 0.2 MG/DL — SIGNIFICANT CHANGE UP (ref 0.2–1.2)
BUN SERPL-MCNC: 8 MG/DL — SIGNIFICANT CHANGE UP (ref 7–23)
CALCIUM SERPL-MCNC: 9.5 MG/DL — SIGNIFICANT CHANGE UP (ref 8.4–10.5)
CHLORIDE SERPL-SCNC: 99 MMOL/L — SIGNIFICANT CHANGE UP (ref 98–107)
CO2 SERPL-SCNC: 21 MMOL/L — LOW (ref 22–31)
CREAT SERPL-MCNC: 0.49 MG/DL — LOW (ref 0.5–1.3)
EOSINOPHIL # BLD AUTO: 0.23 K/UL — SIGNIFICANT CHANGE UP (ref 0–0.5)
EOSINOPHIL NFR BLD AUTO: 2.1 % — SIGNIFICANT CHANGE UP (ref 0–6)
GLUCOSE BLDC GLUCOMTR-MCNC: 159 MG/DL — HIGH (ref 70–99)
GLUCOSE BLDC GLUCOMTR-MCNC: 177 MG/DL — HIGH (ref 70–99)
GLUCOSE BLDC GLUCOMTR-MCNC: 186 MG/DL — HIGH (ref 70–99)
GLUCOSE BLDC GLUCOMTR-MCNC: 254 MG/DL — HIGH (ref 70–99)
GLUCOSE SERPL-MCNC: 326 MG/DL — HIGH (ref 70–99)
HCT VFR BLD CALC: 36.9 % — SIGNIFICANT CHANGE UP (ref 34.5–45)
HGB BLD-MCNC: 11.1 G/DL — LOW (ref 11.5–15.5)
IANC: 8.14 K/UL — SIGNIFICANT CHANGE UP (ref 1.5–8.5)
IMM GRANULOCYTES NFR BLD AUTO: 0.7 % — SIGNIFICANT CHANGE UP (ref 0–1.5)
LYMPHOCYTES # BLD AUTO: 19.5 % — SIGNIFICANT CHANGE UP (ref 13–44)
LYMPHOCYTES # BLD AUTO: 2.17 K/UL — SIGNIFICANT CHANGE UP (ref 1–3.3)
MCHC RBC-ENTMCNC: 21.9 PG — LOW (ref 27–34)
MCHC RBC-ENTMCNC: 30.1 GM/DL — LOW (ref 32–36)
MCV RBC AUTO: 72.9 FL — LOW (ref 80–100)
MONOCYTES # BLD AUTO: 0.49 K/UL — SIGNIFICANT CHANGE UP (ref 0–0.9)
MONOCYTES NFR BLD AUTO: 4.4 % — SIGNIFICANT CHANGE UP (ref 2–14)
NEUTROPHILS # BLD AUTO: 8.14 K/UL — HIGH (ref 1.8–7.4)
NEUTROPHILS NFR BLD AUTO: 73 % — SIGNIFICANT CHANGE UP (ref 43–77)
NRBC # BLD: 0 /100 WBCS — SIGNIFICANT CHANGE UP
NRBC # FLD: 0 K/UL — SIGNIFICANT CHANGE UP
PLATELET # BLD AUTO: 389 K/UL — SIGNIFICANT CHANGE UP (ref 150–400)
POTASSIUM SERPL-MCNC: 4.6 MMOL/L — SIGNIFICANT CHANGE UP (ref 3.5–5.3)
POTASSIUM SERPL-SCNC: 4.6 MMOL/L — SIGNIFICANT CHANGE UP (ref 3.5–5.3)
PROT SERPL-MCNC: 6.9 G/DL — SIGNIFICANT CHANGE UP (ref 6–8.3)
RBC # BLD: 5.06 M/UL — SIGNIFICANT CHANGE UP (ref 3.8–5.2)
RBC # FLD: 17.3 % — HIGH (ref 10.3–14.5)
SODIUM SERPL-SCNC: 136 MMOL/L — SIGNIFICANT CHANGE UP (ref 135–145)
WBC # BLD: 11.14 K/UL — HIGH (ref 3.8–10.5)
WBC # FLD AUTO: 11.14 K/UL — HIGH (ref 3.8–10.5)

## 2021-06-29 PROCEDURE — 99232 SBSQ HOSP IP/OBS MODERATE 35: CPT

## 2021-06-29 RX ORDER — CLONAZEPAM 1 MG
1 TABLET ORAL
Refills: 0 | Status: DISCONTINUED | OUTPATIENT
Start: 2021-06-29 | End: 2021-07-06

## 2021-06-29 RX ORDER — LITHIUM CARBONATE 300 MG/1
600 TABLET, EXTENDED RELEASE ORAL AT BEDTIME
Refills: 0 | Status: DISCONTINUED | OUTPATIENT
Start: 2021-06-29 | End: 2021-06-29

## 2021-06-29 RX ORDER — RISPERIDONE 4 MG/1
4 TABLET ORAL AT BEDTIME
Refills: 0 | Status: DISCONTINUED | OUTPATIENT
Start: 2021-06-29 | End: 2021-07-08

## 2021-06-29 RX ORDER — LITHIUM CARBONATE 300 MG/1
300 TABLET, EXTENDED RELEASE ORAL
Refills: 0 | Status: DISCONTINUED | OUTPATIENT
Start: 2021-06-29 | End: 2021-07-02

## 2021-06-29 RX ORDER — CLONAZEPAM 1 MG
0.5 TABLET ORAL DAILY
Refills: 0 | Status: DISCONTINUED | OUTPATIENT
Start: 2021-06-29 | End: 2021-07-06

## 2021-06-29 RX ADMIN — Medication 1: at 08:36

## 2021-06-29 RX ADMIN — Medication 1 MILLIGRAM(S): at 13:06

## 2021-06-29 RX ADMIN — RISPERIDONE 4 MILLIGRAM(S): 4 TABLET ORAL at 20:34

## 2021-06-29 RX ADMIN — Medication 1 MILLIGRAM(S): at 08:48

## 2021-06-29 RX ADMIN — METFORMIN HYDROCHLORIDE 1000 MILLIGRAM(S): 850 TABLET ORAL at 20:34

## 2021-06-29 RX ADMIN — Medication 10 MILLIGRAM(S): at 08:48

## 2021-06-29 RX ADMIN — LITHIUM CARBONATE 300 MILLIGRAM(S): 300 TABLET, EXTENDED RELEASE ORAL at 08:49

## 2021-06-29 RX ADMIN — LITHIUM CARBONATE 300 MILLIGRAM(S): 300 TABLET, EXTENDED RELEASE ORAL at 20:33

## 2021-06-29 RX ADMIN — Medication 0.5 MILLIGRAM(S): at 20:33

## 2021-06-29 RX ADMIN — Medication 0.5 MILLIGRAM(S): at 08:48

## 2021-06-29 RX ADMIN — Medication 1: at 12:55

## 2021-06-29 RX ADMIN — METFORMIN HYDROCHLORIDE 1000 MILLIGRAM(S): 850 TABLET ORAL at 08:48

## 2021-06-29 RX ADMIN — Medication 1 MILLIGRAM(S): at 20:33

## 2021-06-29 NOTE — PROGRESS NOTE ADULT - ASSESSMENT
45 F with pmhx of DM2 currently admitted to [facility] called to evaluate for DM2 and Fs control.    1- DM2 - a1c 7.5 - FS uncontrolled - at home on multiple regimens - cw metformin and glipizide 10 mg daily, Trulicity and Invokana not available here - will f/u FS today and decide on need for basal insulin    2- LDL at goal - diet control    3- BP - better controlled     4- psych - per primary team - monitor qtc on psychotropics

## 2021-06-29 NOTE — PROGRESS NOTE ADULT - SUBJECTIVE AND OBJECTIVE BOX
Patient is a 45y old  Female who presents with a chief complaint of DM2    SUBJECTIVE / OVERNIGHT EVENTS: no events     ROS:  No HA/DZ  No Vision changes   No CP, SOB  No N/V/D  No Edema  No Rash  NO weakness, numbness    MEDICATIONS  (STANDING):  benztropine 0.5 milliGRAM(s) Oral two times a day  clonazePAM  Tablet 1 milliGRAM(s) Oral three times a day  dextrose 40% Gel 15 Gram(s) Oral once  dextrose 5%. 1000 milliLiter(s) (50 mL/Hr) IV Continuous <Continuous>  dextrose 5%. 1000 milliLiter(s) (100 mL/Hr) IV Continuous <Continuous>  dextrose 50% Injectable 25 Gram(s) IV Push once  dextrose 50% Injectable 12.5 Gram(s) IV Push once  dextrose 50% Injectable 25 Gram(s) IV Push once  glipiZIDE XL. 10 milliGRAM(s) Oral daily  glucagon  Injectable 1 milliGRAM(s) IntraMuscular once  insulin lispro (ADMELOG) corrective regimen sliding scale   SubCutaneous three times a day before meals  lithium SR (LITHOBID) 300 milliGRAM(s) Oral two times a day  metFORMIN 1000 milliGRAM(s) Oral two times a day  risperiDONE   Tablet 3 milliGRAM(s) Oral at bedtime    MEDICATIONS  (PRN):  diphenhydrAMINE 50 milliGRAM(s) Oral every 6 hours PRN agitation  diphenhydrAMINE   Injectable 50 milliGRAM(s) IntraMuscular every 6 hours PRN Agitation  haloperidol     Tablet 5 milliGRAM(s) Oral every 6 hours PRN agitation  haloperidol    Injectable 5 milliGRAM(s) IntraMuscular every 6 hours PRN Agitation  LORazepam     Tablet 2 milliGRAM(s) Oral every 6 hours PRN agitation  LORazepam   Injectable 2 milliGRAM(s) IntraMuscular every 4 hours PRN Agitation      T(C): 36.6 (06-29-21 @ 06:12)  HR: --76  BP: --121/69  RR: --12  SpO2: --  CAPILLARY BLOOD GLUCOSE      POCT Blood Glucose.: 186 mg/dL (29 Jun 2021 07:47)  POCT Blood Glucose.: 245 mg/dL (28 Jun 2021 20:07)  POCT Blood Glucose.: 237 mg/dL (28 Jun 2021 16:32)  POCT Blood Glucose.: 237 mg/dL (28 Jun 2021 11:16)    I&O's Summary      PHYSICAL EXAM:  GENERAL: NAD, well-developed, AOx3  HEAD:  Atraumatic, Normocephalic  EYES: EOMI, PERRL, conjunctiva and sclera clear  NECK: Supple, No JVD  CHEST/LUNG: Clear to auscultation bilaterally  HEART: Regular rate and rhythm; No murmurs, rubs, or gallops, No Edema  ABDOMEN: Soft, Nontender, Nondistended; Bowel sounds present  EXTREMITIES:  2+ Peripheral Pulses, No clubbing, cyanosis  PSYCH: No SI/HI  NEUROLOGY: non-focal  SKIN: No rashes or lesions    LABS:                        RADIOLOGY & ADDITIONAL TESTS:    Imaging Personally Reviewed:    Consultant(s) Notes Reviewed:      Care Discussed with Consultants/Other Providers:

## 2021-06-29 NOTE — BH INPATIENT PSYCHIATRY PROGRESS NOTE - NSBHFUPINTERVALHXFT_PSY_A_CORE
Patient is followed up for Bipolar Disorder, admitted for psychotic decompensation.  Chart, medications and labs reviewed.  Patient is discussed with nursing staff. No significant overnight issues and no PRN medication required for agitation. The patient is isolated to self and is in good behavorial control. She was cooperative during the f/u and appeared calm. Patient still presents with paranoia about her family conspiring against her but today, she does not believe they will kill her. She refused consent for the writer to speak to her family. Remains bizarre and internally preoccupied. At time of interview patient denies SI/SIB/HI, denies all psychotic features, despite psychotic symptoms successfully elicited. Her sleep and appetite are adequate. Remains adherent with medications and denies side effects.  Self- care remains fair.  Overall in good fair to good behavioral control. No acute medical concerns. VSS. Pt offers no complaints. Continue to provide therapeutic support.

## 2021-06-30 LAB
GLUCOSE BLDC GLUCOMTR-MCNC: 211 MG/DL — HIGH (ref 70–99)
GLUCOSE BLDC GLUCOMTR-MCNC: 217 MG/DL — HIGH (ref 70–99)
GLUCOSE BLDC GLUCOMTR-MCNC: 234 MG/DL — HIGH (ref 70–99)
GLUCOSE BLDC GLUCOMTR-MCNC: 236 MG/DL — HIGH (ref 70–99)

## 2021-06-30 PROCEDURE — 99232 SBSQ HOSP IP/OBS MODERATE 35: CPT

## 2021-06-30 RX ORDER — BENZTROPINE MESYLATE 1 MG
0.5 TABLET ORAL AT BEDTIME
Refills: 0 | Status: DISCONTINUED | OUTPATIENT
Start: 2021-06-30 | End: 2021-07-08

## 2021-06-30 RX ORDER — INSULIN GLARGINE 100 [IU]/ML
5 INJECTION, SOLUTION SUBCUTANEOUS AT BEDTIME
Refills: 0 | Status: DISCONTINUED | OUTPATIENT
Start: 2021-06-30 | End: 2021-07-02

## 2021-06-30 RX ADMIN — RISPERIDONE 4 MILLIGRAM(S): 4 TABLET ORAL at 21:26

## 2021-06-30 RX ADMIN — LITHIUM CARBONATE 300 MILLIGRAM(S): 300 TABLET, EXTENDED RELEASE ORAL at 08:30

## 2021-06-30 RX ADMIN — Medication 1 MILLIGRAM(S): at 21:26

## 2021-06-30 RX ADMIN — Medication 0.5 MILLIGRAM(S): at 21:26

## 2021-06-30 RX ADMIN — Medication 2: at 08:26

## 2021-06-30 RX ADMIN — Medication 10 MILLIGRAM(S): at 08:30

## 2021-06-30 RX ADMIN — Medication 2: at 17:19

## 2021-06-30 RX ADMIN — INSULIN GLARGINE 5 UNIT(S): 100 INJECTION, SOLUTION SUBCUTANEOUS at 21:27

## 2021-06-30 RX ADMIN — Medication 2: at 12:15

## 2021-06-30 RX ADMIN — Medication 0.5 MILLIGRAM(S): at 08:30

## 2021-06-30 RX ADMIN — METFORMIN HYDROCHLORIDE 1000 MILLIGRAM(S): 850 TABLET ORAL at 08:30

## 2021-06-30 RX ADMIN — LITHIUM CARBONATE 300 MILLIGRAM(S): 300 TABLET, EXTENDED RELEASE ORAL at 21:26

## 2021-06-30 RX ADMIN — Medication 1 MILLIGRAM(S): at 12:31

## 2021-06-30 RX ADMIN — METFORMIN HYDROCHLORIDE 1000 MILLIGRAM(S): 850 TABLET ORAL at 21:26

## 2021-06-30 NOTE — PROGRESS NOTE ADULT - SUBJECTIVE AND OBJECTIVE BOX
Patient is a 45y old  Female who presents with a chief complaint of     SUBJECTIVE / OVERNIGHT EVENTS:    ROS:  No HA/DZ  No Vision changes   No CP, SOB  No N/V/D  No Edema  No Rash  NO weakness, numbness    MEDICATIONS  (STANDING):  benztropine 0.5 milliGRAM(s) Oral two times a day  clonazePAM Oral Disintegrating Tablet 1 milliGRAM(s) Oral <User Schedule>  clonazePAM Oral Disintegrating Tablet 0.5 milliGRAM(s) Oral daily  dextrose 40% Gel 15 Gram(s) Oral once  dextrose 5%. 1000 milliLiter(s) (50 mL/Hr) IV Continuous <Continuous>  dextrose 5%. 1000 milliLiter(s) (100 mL/Hr) IV Continuous <Continuous>  dextrose 50% Injectable 25 Gram(s) IV Push once  dextrose 50% Injectable 12.5 Gram(s) IV Push once  dextrose 50% Injectable 25 Gram(s) IV Push once  glipiZIDE XL. 10 milliGRAM(s) Oral daily  glucagon  Injectable 1 milliGRAM(s) IntraMuscular once  insulin lispro (ADMELOG) corrective regimen sliding scale   SubCutaneous three times a day before meals  lithium SR (LITHOBID) 300 milliGRAM(s) Oral two times a day  metFORMIN 1000 milliGRAM(s) Oral two times a day  risperiDONE   Tablet 4 milliGRAM(s) Oral at bedtime    MEDICATIONS  (PRN):  diphenhydrAMINE 50 milliGRAM(s) Oral every 6 hours PRN agitation  diphenhydrAMINE   Injectable 50 milliGRAM(s) IntraMuscular every 6 hours PRN Agitation  haloperidol     Tablet 5 milliGRAM(s) Oral every 6 hours PRN agitation  haloperidol    Injectable 5 milliGRAM(s) IntraMuscular every 6 hours PRN Agitation  LORazepam     Tablet 2 milliGRAM(s) Oral every 6 hours PRN agitation  LORazepam   Injectable 2 milliGRAM(s) IntraMuscular every 4 hours PRN Agitation      T(C): 36.2 (06-30-21 @ 06:17)  HR: --76  BP: --117/69  RR: --12  SpO2: --  CAPILLARY BLOOD GLUCOSE      POCT Blood Glucose.: 217 mg/dL (30 Jun 2021 07:58)  POCT Blood Glucose.: 254 mg/dL (29 Jun 2021 20:07)  POCT Blood Glucose.: 159 mg/dL (29 Jun 2021 16:08)  POCT Blood Glucose.: 177 mg/dL (29 Jun 2021 12:30)    I&O's Summary      PHYSICAL EXAM:  GENERAL: NAD, well-developed, AOx3  HEAD:  Atraumatic, Normocephalic  EYES: EOMI, PERRL, conjunctiva and sclera clear  NECK: Supple, No JVD  CHEST/LUNG: Clear to auscultation bilaterally  HEART: Regular rate and rhythm; No murmurs, rubs, or gallops, No Edema  ABDOMEN: Soft, Nontender, Nondistended; Bowel sounds present  EXTREMITIES:  2+ Peripheral Pulses, No clubbing, cyanosis  PSYCH: No SI/HI  NEUROLOGY: non-focal  SKIN: No rashes or lesions    LABS:                        11.1   11.14 )-----------( 389      ( 29 Jun 2021 10:28 )             36.9     06-29    136  |  99  |  8   ----------------------------<  326<H>  4.6   |  21<L>  |  0.49<L>    Ca    9.5      29 Jun 2021 10:28    TPro  6.9  /  Alb  3.7  /  TBili  0.2  /  DBili  x   /  AST  10  /  ALT  12  /  AlkPhos  81  06-29                  RADIOLOGY & ADDITIONAL TESTS:    Imaging Personally Reviewed:    Consultant(s) Notes Reviewed:      Care Discussed with Consultants/Other Providers:   Patient is a 45y old  Female who presents with a chief complaint of DM2    SUBJECTIVE / OVERNIGHT EVENTS: no events     ROS:  No HA/DZ  No Vision changes   No CP, SOB  No N/V/D  No Edema  No Rash  NO weakness, numbness    MEDICATIONS  (STANDING):  benztropine 0.5 milliGRAM(s) Oral two times a day  clonazePAM Oral Disintegrating Tablet 1 milliGRAM(s) Oral <User Schedule>  clonazePAM Oral Disintegrating Tablet 0.5 milliGRAM(s) Oral daily  dextrose 40% Gel 15 Gram(s) Oral once  dextrose 5%. 1000 milliLiter(s) (50 mL/Hr) IV Continuous <Continuous>  dextrose 5%. 1000 milliLiter(s) (100 mL/Hr) IV Continuous <Continuous>  dextrose 50% Injectable 25 Gram(s) IV Push once  dextrose 50% Injectable 12.5 Gram(s) IV Push once  dextrose 50% Injectable 25 Gram(s) IV Push once  glipiZIDE XL. 10 milliGRAM(s) Oral daily  glucagon  Injectable 1 milliGRAM(s) IntraMuscular once  insulin lispro (ADMELOG) corrective regimen sliding scale   SubCutaneous three times a day before meals  lithium SR (LITHOBID) 300 milliGRAM(s) Oral two times a day  metFORMIN 1000 milliGRAM(s) Oral two times a day  risperiDONE   Tablet 4 milliGRAM(s) Oral at bedtime    MEDICATIONS  (PRN):  diphenhydrAMINE 50 milliGRAM(s) Oral every 6 hours PRN agitation  diphenhydrAMINE   Injectable 50 milliGRAM(s) IntraMuscular every 6 hours PRN Agitation  haloperidol     Tablet 5 milliGRAM(s) Oral every 6 hours PRN agitation  haloperidol    Injectable 5 milliGRAM(s) IntraMuscular every 6 hours PRN Agitation  LORazepam     Tablet 2 milliGRAM(s) Oral every 6 hours PRN agitation  LORazepam   Injectable 2 milliGRAM(s) IntraMuscular every 4 hours PRN Agitation      T(C): 36.2 (06-30-21 @ 06:17)  HR: --76  BP: --117/69  RR: --12  SpO2: --  CAPILLARY BLOOD GLUCOSE      POCT Blood Glucose.: 217 mg/dL (30 Jun 2021 07:58)  POCT Blood Glucose.: 254 mg/dL (29 Jun 2021 20:07)  POCT Blood Glucose.: 159 mg/dL (29 Jun 2021 16:08)  POCT Blood Glucose.: 177 mg/dL (29 Jun 2021 12:30)    I&O's Summary      PHYSICAL EXAM:  GENERAL: NAD, well-developed, AOx3  HEAD:  Atraumatic, Normocephalic  EYES: EOMI, PERRL, conjunctiva and sclera clear  NECK: Supple, No JVD  CHEST/LUNG: Clear to auscultation bilaterally  HEART: Regular rate and rhythm; No murmurs, rubs, or gallops, No Edema  ABDOMEN: Soft, Nontender, Nondistended; Bowel sounds present  EXTREMITIES:  2+ Peripheral Pulses, No clubbing, cyanosis  PSYCH: No SI/HI  NEUROLOGY: non-focal  SKIN: No rashes or lesions    LABS:                        11.1   11.14 )-----------( 389      ( 29 Jun 2021 10:28 )             36.9     06-29    136  |  99  |  8   ----------------------------<  326<H>  4.6   |  21<L>  |  0.49<L>    Ca    9.5      29 Jun 2021 10:28    TPro  6.9  /  Alb  3.7  /  TBili  0.2  /  DBili  x   /  AST  10  /  ALT  12  /  AlkPhos  81  06-29                  RADIOLOGY & ADDITIONAL TESTS:    Imaging Personally Reviewed:    Consultant(s) Notes Reviewed:      Care Discussed with Consultants/Other Providers:

## 2021-06-30 NOTE — BH INPATIENT PSYCHIATRY PROGRESS NOTE - NSBHFUPINTERVALHXFT_PSY_A_CORE
Patient is followed up for Bipolar Disorder, admitted for psychotic decompensation.  Chart, medications and labs reviewed.  Patient was discussed with nursing staff. No significant overnight issues and no PRN medication required for agitation. Noted she is more visible on the unit and social with selective peers. Appears to be in good behavorial control. Patient reported her mood is "good" but as per staff has periods of irritability. She was cooperative and engaged during the f/u today. She appears less paranoid about her family and does not think they will kill her. No delusions elicited at this time. She denies SI/SIB/HI. Denies perceptual disturbances such as AH, VH, TH. Her sleep and appetite are adequate. Remains adherent with medications and denies side effects.  Self- care remains fair. No acute medical concerns. VSS. Pt offers no complaints. Continue to provide therapeutic support.

## 2021-06-30 NOTE — PROGRESS NOTE ADULT - ASSESSMENT
45 F with pmhx of DM2 currently admitted to [facility] called to evaluate for DM2 and Fs control.    1- DM2 - a1c 7.5 - FS uncontrolled - at home on multiple regimens - cw metformin and glipizide 10 mg daily, Trulicity and Invokana not available here - will add lantus 5 units QHS and monitor FS - diet control - can resume home regimen on discharge     2- LDL at goal - diet control    3- BP - tightly controlled - unable to start Ace-i at this time     4- psych - per primary team - monitor qtc on psychotropics    45 F with pmhx of DM2 currently admitted to [facility] called to evaluate for DM2 and Fs control.    1- DM2 - a1c 7.5 - FS uncontrolled @ mid 200 - refused 1 dose of NISS yesterday - required 2 units yesterday and 2 units this AM - at home on multiple regimens - cw metformin and glipizide 10 mg daily, Trulicity and Invokana not available here - will add lantus 5 units QHS and monitor FS - diet control - can resume home regimen on discharge     2- LDL at goal - diet control    3- BP - tightly controlled - unable to start Ace-i at this time     4- psych - per primary team - monitor qtc on psychotropics

## 2021-07-01 LAB
ALBUMIN SERPL ELPH-MCNC: 4 G/DL — SIGNIFICANT CHANGE UP (ref 3.3–5)
ALP SERPL-CCNC: 86 U/L — SIGNIFICANT CHANGE UP (ref 40–120)
ALT FLD-CCNC: 14 U/L — SIGNIFICANT CHANGE UP (ref 4–33)
ANION GAP SERPL CALC-SCNC: 13 MMOL/L — SIGNIFICANT CHANGE UP (ref 7–14)
AST SERPL-CCNC: 9 U/L — SIGNIFICANT CHANGE UP (ref 4–32)
BASOPHILS # BLD AUTO: 0.04 K/UL — SIGNIFICANT CHANGE UP (ref 0–0.2)
BASOPHILS NFR BLD AUTO: 0.3 % — SIGNIFICANT CHANGE UP (ref 0–2)
BILIRUB SERPL-MCNC: <0.2 MG/DL — SIGNIFICANT CHANGE UP (ref 0.2–1.2)
BUN SERPL-MCNC: 9 MG/DL — SIGNIFICANT CHANGE UP (ref 7–23)
CALCIUM SERPL-MCNC: 9.6 MG/DL — SIGNIFICANT CHANGE UP (ref 8.4–10.5)
CHLORIDE SERPL-SCNC: 98 MMOL/L — SIGNIFICANT CHANGE UP (ref 98–107)
CO2 SERPL-SCNC: 23 MMOL/L — SIGNIFICANT CHANGE UP (ref 22–31)
CREAT SERPL-MCNC: 0.45 MG/DL — LOW (ref 0.5–1.3)
EOSINOPHIL # BLD AUTO: 0.38 K/UL — SIGNIFICANT CHANGE UP (ref 0–0.5)
EOSINOPHIL NFR BLD AUTO: 3 % — SIGNIFICANT CHANGE UP (ref 0–6)
GLUCOSE BLDC GLUCOMTR-MCNC: 126 MG/DL — HIGH (ref 70–99)
GLUCOSE BLDC GLUCOMTR-MCNC: 171 MG/DL — HIGH (ref 70–99)
GLUCOSE BLDC GLUCOMTR-MCNC: 193 MG/DL — HIGH (ref 70–99)
GLUCOSE BLDC GLUCOMTR-MCNC: 201 MG/DL — HIGH (ref 70–99)
GLUCOSE SERPL-MCNC: 323 MG/DL — HIGH (ref 70–99)
HCT VFR BLD CALC: 36.1 % — SIGNIFICANT CHANGE UP (ref 34.5–45)
HGB BLD-MCNC: 10.8 G/DL — LOW (ref 11.5–15.5)
IANC: 9.29 K/UL — HIGH (ref 1.5–8.5)
IMM GRANULOCYTES NFR BLD AUTO: 0.7 % — SIGNIFICANT CHANGE UP (ref 0–1.5)
LITHIUM SERPL-MCNC: 0.4 MMOL/L — LOW (ref 0.6–1.2)
LYMPHOCYTES # BLD AUTO: 17.7 % — SIGNIFICANT CHANGE UP (ref 13–44)
LYMPHOCYTES # BLD AUTO: 2.22 K/UL — SIGNIFICANT CHANGE UP (ref 1–3.3)
MCHC RBC-ENTMCNC: 21.9 PG — LOW (ref 27–34)
MCHC RBC-ENTMCNC: 29.9 GM/DL — LOW (ref 32–36)
MCV RBC AUTO: 73.2 FL — LOW (ref 80–100)
MONOCYTES # BLD AUTO: 0.51 K/UL — SIGNIFICANT CHANGE UP (ref 0–0.9)
MONOCYTES NFR BLD AUTO: 4.1 % — SIGNIFICANT CHANGE UP (ref 2–14)
NEUTROPHILS # BLD AUTO: 9.29 K/UL — HIGH (ref 1.8–7.4)
NEUTROPHILS NFR BLD AUTO: 74.2 % — SIGNIFICANT CHANGE UP (ref 43–77)
NRBC # BLD: 0 /100 WBCS — SIGNIFICANT CHANGE UP
NRBC # FLD: 0 K/UL — SIGNIFICANT CHANGE UP
PLATELET # BLD AUTO: 392 K/UL — SIGNIFICANT CHANGE UP (ref 150–400)
POTASSIUM SERPL-MCNC: 4.1 MMOL/L — SIGNIFICANT CHANGE UP (ref 3.5–5.3)
POTASSIUM SERPL-SCNC: 4.1 MMOL/L — SIGNIFICANT CHANGE UP (ref 3.5–5.3)
PROT SERPL-MCNC: 7 G/DL — SIGNIFICANT CHANGE UP (ref 6–8.3)
RBC # BLD: 4.93 M/UL — SIGNIFICANT CHANGE UP (ref 3.8–5.2)
RBC # FLD: 17.2 % — HIGH (ref 10.3–14.5)
SODIUM SERPL-SCNC: 134 MMOL/L — LOW (ref 135–145)
WBC # BLD: 12.53 K/UL — HIGH (ref 3.8–10.5)
WBC # FLD AUTO: 12.53 K/UL — HIGH (ref 3.8–10.5)

## 2021-07-01 PROCEDURE — 99232 SBSQ HOSP IP/OBS MODERATE 35: CPT

## 2021-07-01 RX ORDER — CANAGLIFLOZIN 100 MG/1
1 TABLET, FILM COATED ORAL
Qty: 14 | Refills: 0
Start: 2021-07-01 | End: 2021-07-14

## 2021-07-01 RX ORDER — DULAGLUTIDE 4.5 MG/.5ML
3 INJECTION, SOLUTION SUBCUTANEOUS
Qty: 1 | Refills: 0
Start: 2021-07-01 | End: 2021-07-14

## 2021-07-01 RX ADMIN — RISPERIDONE 4 MILLIGRAM(S): 4 TABLET ORAL at 21:20

## 2021-07-01 RX ADMIN — METFORMIN HYDROCHLORIDE 1000 MILLIGRAM(S): 850 TABLET ORAL at 08:52

## 2021-07-01 RX ADMIN — Medication 10 MILLIGRAM(S): at 08:52

## 2021-07-01 RX ADMIN — Medication 1 MILLIGRAM(S): at 13:10

## 2021-07-01 RX ADMIN — METFORMIN HYDROCHLORIDE 1000 MILLIGRAM(S): 850 TABLET ORAL at 21:20

## 2021-07-01 RX ADMIN — Medication 0.5 MILLIGRAM(S): at 08:52

## 2021-07-01 RX ADMIN — LITHIUM CARBONATE 300 MILLIGRAM(S): 300 TABLET, EXTENDED RELEASE ORAL at 21:20

## 2021-07-01 RX ADMIN — INSULIN GLARGINE 5 UNIT(S): 100 INJECTION, SOLUTION SUBCUTANEOUS at 21:21

## 2021-07-01 RX ADMIN — Medication 2: at 12:21

## 2021-07-01 RX ADMIN — Medication 0.5 MILLIGRAM(S): at 21:21

## 2021-07-01 RX ADMIN — LITHIUM CARBONATE 300 MILLIGRAM(S): 300 TABLET, EXTENDED RELEASE ORAL at 08:51

## 2021-07-01 RX ADMIN — Medication 1 MILLIGRAM(S): at 21:21

## 2021-07-01 NOTE — PROGRESS NOTE ADULT - ASSESSMENT
45 F with pmhx of DM2 currently admitted to [facility] called to evaluate for DM2 and Fs control.    1- DM2 - a1c 7.5 - FS better controlled -  at home on multiple regimens - cw metformin and glipizide 10 mg daily, Trulicity and Invokana not available here - cw  lantus 5 units QHS and monitor FS, will adjust as needed - diet control - can resume home regimen on discharge     2- LDL at goal - diet control    3- BP - tightly controlled - unable to start Ace-i at this time     4- psych - per primary team - monitor qtc on psychotropics

## 2021-07-01 NOTE — BH INPATIENT PSYCHIATRY PROGRESS NOTE - NSBHFUPINTERVALHXFT_PSY_A_CORE
Patient is followed up for Bipolar Disorder, admitted for psychotic decompensation.  Chart, medications and labs reviewed.  Patient was discussed with nursing staff. No interval events.  Patient reported her mood is "good" but as per staff has periods of irritability. She was cooperative and engaged during the f/u today. No prn for aggression, in fair behavioral control. Per nursing staff patients symptoms have attenuated.  Patient shows less overt manifestations. No delusions elicited at this time. She denies SI/SIB/HI. Denies perceptual disturbances no AVH. Reports goo sleep and no appetite concerns. Remains adherent with medications and denies SE.  Patient reports she has not been getting her home diabetes medication Trulicity .3mg weekly and Invokana 300mg daily. Writer spoke with pharmacy and [facility] does not carry these medications. Writer will order medications from [facility] and resubmit to [facility]. Case discussed with medicine Dr. Allan,  who reports if patient is able to get above medications should discontinue Lantus qhs.  Self- care remains fair. No acute medical concerns. VSS. POCT 193.  Pt offers no complaints. Continue to provide therapeutic support. Patient is followed up for Bipolar Disorder, admitted for psychotic decompensation.  Chart, medications and labs reviewed.  Patient was discussed with nursing staff. No interval events.  Patient reported her mood is "good" but as per staff has periods of irritability. She was cooperative and engaged during the f/u today. No prn for aggression, in fair behavioral control. Per nursing staff patients symptoms have attenuated.  Patient shows less overt manifestations. No delusions elicited at this time. She denies SI/SIB/HI. Denies perceptual disturbances no AVH. Reports good sleep and no appetite concerns. Remains adherent with medications and denies SE.  Patient reports she has not been getting her home diabetes medication Trulicity 3mg weekly and Invokana 300mg daily. Writer spoke with pharmacy and [facility] does not carry these medications. Writer will order medications from [facility] pts insurance reports too soon to fill. Case discussed with medicine Dr. Allan,  who reports Lantus was added to regimen bc patient is able to get above medications. NP will need to discontinue Lantus at dc and pt will resume home medications.  Self- care remains fair. No acute medical concerns. VSS. POCT 193.  Pt offers no complaints. Continue to provide therapeutic support.

## 2021-07-01 NOTE — BH INPATIENT PSYCHIATRY PROGRESS NOTE - NSBHMSESPABN_PSY_A_CORE
Loud volume/Pressured rate

## 2021-07-01 NOTE — PROGRESS NOTE ADULT - SUBJECTIVE AND OBJECTIVE BOX
Patient is a 45y old  Female who presents with a chief complaint of DM2    SUBJECTIVE / OVERNIGHT EVENTS: no events     ROS:  No HA/DZ  No Vision changes   No CP, SOB  No N/V/D  No Edema  No Rash  NO weakness, numbness    MEDICATIONS  (STANDING):  benztropine 0.5 milliGRAM(s) Oral at bedtime  clonazePAM Oral Disintegrating Tablet 1 milliGRAM(s) Oral <User Schedule>  clonazePAM Oral Disintegrating Tablet 0.5 milliGRAM(s) Oral daily  dextrose 40% Gel 15 Gram(s) Oral once  dextrose 5%. 1000 milliLiter(s) (50 mL/Hr) IV Continuous <Continuous>  dextrose 5%. 1000 milliLiter(s) (100 mL/Hr) IV Continuous <Continuous>  dextrose 50% Injectable 25 Gram(s) IV Push once  dextrose 50% Injectable 12.5 Gram(s) IV Push once  dextrose 50% Injectable 25 Gram(s) IV Push once  glipiZIDE XL. 10 milliGRAM(s) Oral daily  glucagon  Injectable 1 milliGRAM(s) IntraMuscular once  insulin glargine Injectable (LANTUS) 5 Unit(s) SubCutaneous at bedtime  insulin lispro (ADMELOG) corrective regimen sliding scale   SubCutaneous three times a day before meals  lithium SR (LITHOBID) 300 milliGRAM(s) Oral two times a day  metFORMIN 1000 milliGRAM(s) Oral two times a day  risperiDONE   Tablet 4 milliGRAM(s) Oral at bedtime    MEDICATIONS  (PRN):  diphenhydrAMINE 50 milliGRAM(s) Oral every 6 hours PRN agitation  diphenhydrAMINE   Injectable 50 milliGRAM(s) IntraMuscular every 6 hours PRN Agitation  haloperidol     Tablet 5 milliGRAM(s) Oral every 6 hours PRN agitation  haloperidol    Injectable 5 milliGRAM(s) IntraMuscular every 6 hours PRN Agitation  LORazepam     Tablet 2 milliGRAM(s) Oral every 6 hours PRN agitation  LORazepam   Injectable 2 milliGRAM(s) IntraMuscular every 4 hours PRN Agitation      T(C): 36.8 (07-01-21 @ 06:38)  HR: --105/58  BP: --92  RR: 16 (06-30-21 @ 20:28)  SpO2: 99% (06-30-21 @ 20:28)  CAPILLARY BLOOD GLUCOSE      POCT Blood Glucose.: 193 mg/dL (01 Jul 2021 07:50)  POCT Blood Glucose.: 211 mg/dL (30 Jun 2021 20:21)  POCT Blood Glucose.: 234 mg/dL (30 Jun 2021 17:06)  POCT Blood Glucose.: 236 mg/dL (30 Jun 2021 12:04)    I&O's Summary      PHYSICAL EXAM:  GENERAL: NAD, well-developed  HEAD:  Atraumatic, Normocephalic  EYES: EOMI, PERRL, conjunctiva and sclera clear  NECK: Supple, No JVD  CHEST/LUNG: Clear to auscultation bilaterally  HEART: Regular rate and rhythm; No murmurs, rubs, or gallops, No Edema  ABDOMEN: Soft, Nontender, Nondistended; Bowel sounds present  EXTREMITIES:  2+ Peripheral Pulses, No clubbing, cyanosis  PSYCH: No SI/HI  NEUROLOGY: non-focal  SKIN: No rashes or lesions    LABS:                        11.1   11.14 )-----------( 389      ( 29 Jun 2021 10:28 )             36.9     06-29    136  |  99  |  8   ----------------------------<  326<H>  4.6   |  21<L>  |  0.49<L>    Ca    9.5      29 Jun 2021 10:28    TPro  6.9  /  Alb  3.7  /  TBili  0.2  /  DBili  x   /  AST  10  /  ALT  12  /  AlkPhos  81  06-29                  RADIOLOGY & ADDITIONAL TESTS:    Imaging Personally Reviewed:    Consultant(s) Notes Reviewed:      Care Discussed with Consultants/Other Providers:

## 2021-07-02 LAB
GLUCOSE BLDC GLUCOMTR-MCNC: 172 MG/DL — HIGH (ref 70–99)
GLUCOSE BLDC GLUCOMTR-MCNC: 184 MG/DL — HIGH (ref 70–99)
GLUCOSE BLDC GLUCOMTR-MCNC: 191 MG/DL — HIGH (ref 70–99)
GLUCOSE BLDC GLUCOMTR-MCNC: 246 MG/DL — HIGH (ref 70–99)

## 2021-07-02 PROCEDURE — 99232 SBSQ HOSP IP/OBS MODERATE 35: CPT

## 2021-07-02 RX ORDER — INSULIN GLARGINE 100 [IU]/ML
10 INJECTION, SOLUTION SUBCUTANEOUS AT BEDTIME
Refills: 0 | Status: DISCONTINUED | OUTPATIENT
Start: 2021-07-02 | End: 2021-07-04

## 2021-07-02 RX ADMIN — Medication 1 MILLIGRAM(S): at 12:50

## 2021-07-02 RX ADMIN — Medication 10 MILLIGRAM(S): at 08:46

## 2021-07-02 RX ADMIN — Medication 0.5 MILLIGRAM(S): at 08:46

## 2021-07-02 RX ADMIN — Medication 1: at 12:23

## 2021-07-02 RX ADMIN — INSULIN GLARGINE 10 UNIT(S): 100 INJECTION, SOLUTION SUBCUTANEOUS at 21:16

## 2021-07-02 RX ADMIN — Medication 2: at 08:49

## 2021-07-02 RX ADMIN — METFORMIN HYDROCHLORIDE 1000 MILLIGRAM(S): 850 TABLET ORAL at 08:46

## 2021-07-02 RX ADMIN — Medication 0.5 MILLIGRAM(S): at 21:16

## 2021-07-02 RX ADMIN — RISPERIDONE 4 MILLIGRAM(S): 4 TABLET ORAL at 21:17

## 2021-07-02 RX ADMIN — LITHIUM CARBONATE 300 MILLIGRAM(S): 300 TABLET, EXTENDED RELEASE ORAL at 08:46

## 2021-07-02 RX ADMIN — METFORMIN HYDROCHLORIDE 1000 MILLIGRAM(S): 850 TABLET ORAL at 21:17

## 2021-07-02 RX ADMIN — LITHIUM CARBONATE 300 MILLIGRAM(S): 300 TABLET, EXTENDED RELEASE ORAL at 21:17

## 2021-07-02 RX ADMIN — Medication 1 MILLIGRAM(S): at 21:16

## 2021-07-02 NOTE — PROGRESS NOTE ADULT - SUBJECTIVE AND OBJECTIVE BOX
Patient is a 45y old  Female who presents with a chief complaint of DM2    SUBJECTIVE / OVERNIGHT EVENTS: no events     ROS:  No HA/DZ  No Vision changes   No CP, SOB  No N/V/D  No Edema  No Rash  NO weakness, numbness    MEDICATIONS  (STANDING):  benztropine 0.5 milliGRAM(s) Oral at bedtime  clonazePAM Oral Disintegrating Tablet 1 milliGRAM(s) Oral <User Schedule>  clonazePAM Oral Disintegrating Tablet 0.5 milliGRAM(s) Oral daily  dextrose 40% Gel 15 Gram(s) Oral once  dextrose 5%. 1000 milliLiter(s) (50 mL/Hr) IV Continuous <Continuous>  dextrose 5%. 1000 milliLiter(s) (100 mL/Hr) IV Continuous <Continuous>  dextrose 50% Injectable 25 Gram(s) IV Push once  dextrose 50% Injectable 12.5 Gram(s) IV Push once  dextrose 50% Injectable 25 Gram(s) IV Push once  glipiZIDE XL. 10 milliGRAM(s) Oral daily  glucagon  Injectable 1 milliGRAM(s) IntraMuscular once  insulin glargine Injectable (LANTUS) 5 Unit(s) SubCutaneous at bedtime  insulin lispro (ADMELOG) corrective regimen sliding scale   SubCutaneous three times a day before meals  lithium SR (LITHOBID) 300 milliGRAM(s) Oral two times a day  metFORMIN 1000 milliGRAM(s) Oral two times a day  risperiDONE   Tablet 4 milliGRAM(s) Oral at bedtime    MEDICATIONS  (PRN):  diphenhydrAMINE 50 milliGRAM(s) Oral every 6 hours PRN agitation  diphenhydrAMINE   Injectable 50 milliGRAM(s) IntraMuscular every 6 hours PRN Agitation  haloperidol     Tablet 5 milliGRAM(s) Oral every 6 hours PRN agitation  haloperidol    Injectable 5 milliGRAM(s) IntraMuscular every 6 hours PRN Agitation  LORazepam     Tablet 2 milliGRAM(s) Oral every 6 hours PRN agitation  LORazepam   Injectable 2 milliGRAM(s) IntraMuscular every 4 hours PRN Agitation      T(C): 35.8 (07-02-21 @ 06:30)  HR: 97 (07-02-21 @ 07:59)  BP: 112/60 (07-02-21 @ 07:59)  RR: 16 (07-01-21 @ 21:03)  SpO2: 99% (07-01-21 @ 21:03)  CAPILLARY BLOOD GLUCOSE      POCT Blood Glucose.: 246 mg/dL (02 Jul 2021 07:56)  POCT Blood Glucose.: 171 mg/dL (01 Jul 2021 20:13)  POCT Blood Glucose.: 126 mg/dL (01 Jul 2021 16:16)  POCT Blood Glucose.: 201 mg/dL (01 Jul 2021 11:25)    I&O's Summary      PHYSICAL EXAM:  GENERAL: NAD, well-developed, AOx3  HEAD:  Atraumatic, Normocephalic  EYES: EOMI, PERRL, conjunctiva and sclera clear  NECK: Supple, No JVD  CHEST/LUNG: Clear to auscultation bilaterally  HEART: Regular rate and rhythm; No murmurs, rubs, or gallops, No Edema  ABDOMEN: Soft, Nontender, Nondistended; Bowel sounds present  EXTREMITIES:  2+ Peripheral Pulses, No clubbing, cyanosis  PSYCH: No SI/HI  NEUROLOGY: non-focal  SKIN: No rashes or lesions    LABS:                        10.8   12.53 )-----------( 392      ( 01 Jul 2021 10:27 )             36.1     07-01    134<L>  |  98  |  9   ----------------------------<  323<H>  4.1   |  23  |  0.45<L>    Ca    9.6      01 Jul 2021 10:27    TPro  7.0  /  Alb  4.0  /  TBili  <0.2  /  DBili  x   /  AST  9   /  ALT  14  /  AlkPhos  86  07-01                  RADIOLOGY & ADDITIONAL TESTS:    Imaging Personally Reviewed:    Consultant(s) Notes Reviewed:      Care Discussed with Consultants/Other Providers:

## 2021-07-02 NOTE — PROGRESS NOTE ADULT - ASSESSMENT
45 F with pmhx of DM2 currently admitted to [facility] called to evaluate for DM2 and Fs control.    1- DM2 - a1c 7.5 - FS better controlled, but AM still remains high-  at home on multiple regimens, primary team unable to resume home regimen here after d/w Vivo - cw metformin and glipizide 10 mg daily, Trulicity and Invokana not available here after dw Vivo and insurance (filled too soon) - inc lantus to 10 units QHS and monitor FS, will adjust as needed - diet control - can resume home regimen on discharge     2- LDL at goal - diet control    3- BP - tightly controlled - unable to start Ace-i at this time     4- psych - per primary team - monitor qtc on psychotropics

## 2021-07-02 NOTE — BH INPATIENT PSYCHIATRY PROGRESS NOTE - NSBHFUPINTERVALHXFT_PSY_A_CORE
Patient is followed up for Bipolar Disorder, admitted for psychotic decompensation.  Chart, medications and labs reviewed.  Patient was discussed with nursing staff. No interval events and no PRN medication required for agitation.  Patient reported her mood as "good". Noted she visible on the unit and social with selective peers. Continues to be in fair to good behavorial control. During the follow-up, she was calm, cooperative and attentive. The patient answered questions appropriately. Patient shows less overt manifestations. No delusions elicited at this time. She denies SI/SIB/HI. Denies perceptual disturbances no AVH. Reports good sleep and no appetite concerns. Remains adherent with medications and denies SE.  Self- care remains adequate. No acute medical concerns. Pt offers no complaints. Continue to provide therapeutic support. Continues to refuse consent for writer to contact family.

## 2021-07-03 LAB
GLUCOSE BLDC GLUCOMTR-MCNC: 134 MG/DL — HIGH (ref 70–99)
GLUCOSE BLDC GLUCOMTR-MCNC: 218 MG/DL — HIGH (ref 70–99)
GLUCOSE BLDC GLUCOMTR-MCNC: 241 MG/DL — HIGH (ref 70–99)
GLUCOSE BLDC GLUCOMTR-MCNC: 95 MG/DL — SIGNIFICANT CHANGE UP (ref 70–99)

## 2021-07-03 RX ADMIN — Medication 0.5 MILLIGRAM(S): at 20:33

## 2021-07-03 RX ADMIN — METFORMIN HYDROCHLORIDE 1000 MILLIGRAM(S): 850 TABLET ORAL at 09:33

## 2021-07-03 RX ADMIN — Medication 2: at 12:07

## 2021-07-03 RX ADMIN — INSULIN GLARGINE 10 UNIT(S): 100 INJECTION, SOLUTION SUBCUTANEOUS at 20:38

## 2021-07-03 RX ADMIN — Medication 1 MILLIGRAM(S): at 20:33

## 2021-07-03 RX ADMIN — Medication 1 MILLIGRAM(S): at 13:16

## 2021-07-03 RX ADMIN — METFORMIN HYDROCHLORIDE 1000 MILLIGRAM(S): 850 TABLET ORAL at 20:33

## 2021-07-03 RX ADMIN — Medication 10 MILLIGRAM(S): at 09:33

## 2021-07-03 RX ADMIN — Medication 0.5 MILLIGRAM(S): at 09:33

## 2021-07-03 RX ADMIN — RISPERIDONE 4 MILLIGRAM(S): 4 TABLET ORAL at 20:33

## 2021-07-04 LAB
GLUCOSE BLDC GLUCOMTR-MCNC: 147 MG/DL — HIGH (ref 70–99)
GLUCOSE BLDC GLUCOMTR-MCNC: 179 MG/DL — HIGH (ref 70–99)
GLUCOSE BLDC GLUCOMTR-MCNC: 204 MG/DL — HIGH (ref 70–99)
GLUCOSE BLDC GLUCOMTR-MCNC: 210 MG/DL — HIGH (ref 70–99)

## 2021-07-04 RX ORDER — INSULIN GLARGINE 100 [IU]/ML
11 INJECTION, SOLUTION SUBCUTANEOUS AT BEDTIME
Refills: 0 | Status: DISCONTINUED | OUTPATIENT
Start: 2021-07-04 | End: 2021-07-05

## 2021-07-04 RX ADMIN — Medication 0.5 MILLIGRAM(S): at 09:04

## 2021-07-04 RX ADMIN — Medication 2: at 11:35

## 2021-07-04 RX ADMIN — Medication 1 MILLIGRAM(S): at 22:07

## 2021-07-04 RX ADMIN — Medication 0.5 MILLIGRAM(S): at 21:58

## 2021-07-04 RX ADMIN — METFORMIN HYDROCHLORIDE 1000 MILLIGRAM(S): 850 TABLET ORAL at 21:58

## 2021-07-04 RX ADMIN — Medication 1: at 07:49

## 2021-07-04 RX ADMIN — INSULIN GLARGINE 11 UNIT(S): 100 INJECTION, SOLUTION SUBCUTANEOUS at 22:04

## 2021-07-04 RX ADMIN — Medication 10 MILLIGRAM(S): at 09:04

## 2021-07-04 RX ADMIN — Medication 1 MILLIGRAM(S): at 12:38

## 2021-07-04 RX ADMIN — METFORMIN HYDROCHLORIDE 1000 MILLIGRAM(S): 850 TABLET ORAL at 09:04

## 2021-07-04 RX ADMIN — RISPERIDONE 4 MILLIGRAM(S): 4 TABLET ORAL at 21:58

## 2021-07-05 LAB
GLUCOSE BLDC GLUCOMTR-MCNC: 166 MG/DL — HIGH (ref 70–99)
GLUCOSE BLDC GLUCOMTR-MCNC: 224 MG/DL — HIGH (ref 70–99)
GLUCOSE BLDC GLUCOMTR-MCNC: 229 MG/DL — HIGH (ref 70–99)
GLUCOSE BLDC GLUCOMTR-MCNC: 268 MG/DL — HIGH (ref 70–99)

## 2021-07-05 RX ORDER — INSULIN GLARGINE 100 [IU]/ML
13 INJECTION, SOLUTION SUBCUTANEOUS AT BEDTIME
Refills: 0 | Status: DISCONTINUED | OUTPATIENT
Start: 2021-07-05 | End: 2021-07-08

## 2021-07-05 RX ADMIN — Medication 3: at 08:05

## 2021-07-05 RX ADMIN — Medication 0.5 MILLIGRAM(S): at 09:15

## 2021-07-05 RX ADMIN — Medication 1: at 16:50

## 2021-07-05 RX ADMIN — METFORMIN HYDROCHLORIDE 1000 MILLIGRAM(S): 850 TABLET ORAL at 20:30

## 2021-07-05 RX ADMIN — Medication 2: at 11:39

## 2021-07-05 RX ADMIN — Medication 0.5 MILLIGRAM(S): at 20:30

## 2021-07-05 RX ADMIN — Medication 1 MILLIGRAM(S): at 14:11

## 2021-07-05 RX ADMIN — Medication 1 MILLIGRAM(S): at 20:29

## 2021-07-05 RX ADMIN — INSULIN GLARGINE 13 UNIT(S): 100 INJECTION, SOLUTION SUBCUTANEOUS at 20:34

## 2021-07-05 RX ADMIN — METFORMIN HYDROCHLORIDE 1000 MILLIGRAM(S): 850 TABLET ORAL at 09:15

## 2021-07-05 RX ADMIN — RISPERIDONE 4 MILLIGRAM(S): 4 TABLET ORAL at 20:29

## 2021-07-05 RX ADMIN — Medication 10 MILLIGRAM(S): at 09:15

## 2021-07-06 LAB
GLUCOSE BLDC GLUCOMTR-MCNC: 193 MG/DL — HIGH (ref 70–99)
GLUCOSE BLDC GLUCOMTR-MCNC: 205 MG/DL — HIGH (ref 70–99)
GLUCOSE BLDC GLUCOMTR-MCNC: 217 MG/DL — HIGH (ref 70–99)
GLUCOSE BLDC GLUCOMTR-MCNC: 221 MG/DL — HIGH (ref 70–99)

## 2021-07-06 PROCEDURE — 99232 SBSQ HOSP IP/OBS MODERATE 35: CPT

## 2021-07-06 RX ORDER — CLONAZEPAM 1 MG
1 TABLET ORAL
Refills: 0 | Status: DISCONTINUED | OUTPATIENT
Start: 2021-07-06 | End: 2021-07-06

## 2021-07-06 RX ORDER — CLONAZEPAM 1 MG
0.5 TABLET ORAL THREE TIMES A DAY
Refills: 0 | Status: DISCONTINUED | OUTPATIENT
Start: 2021-07-06 | End: 2021-07-08

## 2021-07-06 RX ADMIN — Medication 0.5 MILLIGRAM(S): at 21:37

## 2021-07-06 RX ADMIN — RISPERIDONE 4 MILLIGRAM(S): 4 TABLET ORAL at 21:38

## 2021-07-06 RX ADMIN — METFORMIN HYDROCHLORIDE 1000 MILLIGRAM(S): 850 TABLET ORAL at 08:33

## 2021-07-06 RX ADMIN — Medication 1: at 08:35

## 2021-07-06 RX ADMIN — METFORMIN HYDROCHLORIDE 1000 MILLIGRAM(S): 850 TABLET ORAL at 21:38

## 2021-07-06 RX ADMIN — INSULIN GLARGINE 13 UNIT(S): 100 INJECTION, SOLUTION SUBCUTANEOUS at 21:38

## 2021-07-06 RX ADMIN — Medication 2: at 17:06

## 2021-07-06 RX ADMIN — Medication 0.5 MILLIGRAM(S): at 08:33

## 2021-07-06 RX ADMIN — Medication 10 MILLIGRAM(S): at 08:34

## 2021-07-06 RX ADMIN — Medication 2: at 12:23

## 2021-07-06 RX ADMIN — Medication 1 MILLIGRAM(S): at 12:46

## 2021-07-06 NOTE — BH INPATIENT PSYCHIATRY PROGRESS NOTE - NSBHFUPINTERVALHXFT_PSY_A_CORE
Patient is followed up for Bipolar Disorder, admitted for psychotic decompensation.  Chart, medications and labs reviewed.  Patient was discussed with nursing staff. No interval events and no PRN medication required for agitation. Patient presents with good behavorial control. On approach, noted patient sitting calmly on her bed listening to music. She was pleasant and cooperative during the f/ u today. Reported her mood as "good". Noted she visible on the unit and attends group therapy intermittently. During the follow-up, she was calm, cooperative and attentive. The patient answered questions appropriately. Noted some paranoia related to her family conspiring against her but does not believe any will harm or kill her. She denies SI/SIB/HI. Denies perceptual disturbances no AVH. Sleep and appetite are adequate. Remains adherent with medications and denies side effects.  Self- care remains adequate. No acute medical concerns. Pt offers no complaints. Continue to provide therapeutic support.     Patient consented for Jeet Rader (uncle/ adopted father) [phone]. Attempted to contact and left a voicemail.

## 2021-07-07 LAB
GLUCOSE BLDC GLUCOMTR-MCNC: 184 MG/DL — HIGH (ref 70–99)
GLUCOSE BLDC GLUCOMTR-MCNC: 212 MG/DL — HIGH (ref 70–99)
GLUCOSE BLDC GLUCOMTR-MCNC: 217 MG/DL — HIGH (ref 70–99)
GLUCOSE BLDC GLUCOMTR-MCNC: 235 MG/DL — HIGH (ref 70–99)

## 2021-07-07 PROCEDURE — 99232 SBSQ HOSP IP/OBS MODERATE 35: CPT

## 2021-07-07 RX ADMIN — METFORMIN HYDROCHLORIDE 1000 MILLIGRAM(S): 850 TABLET ORAL at 20:56

## 2021-07-07 RX ADMIN — Medication 0.5 MILLIGRAM(S): at 20:57

## 2021-07-07 RX ADMIN — Medication 10 MILLIGRAM(S): at 08:13

## 2021-07-07 RX ADMIN — INSULIN GLARGINE 13 UNIT(S): 100 INJECTION, SOLUTION SUBCUTANEOUS at 20:57

## 2021-07-07 RX ADMIN — Medication 0.5 MILLIGRAM(S): at 08:13

## 2021-07-07 RX ADMIN — RISPERIDONE 4 MILLIGRAM(S): 4 TABLET ORAL at 20:56

## 2021-07-07 RX ADMIN — Medication 0.5 MILLIGRAM(S): at 12:08

## 2021-07-07 RX ADMIN — Medication 2: at 16:38

## 2021-07-07 RX ADMIN — Medication 2: at 12:08

## 2021-07-07 RX ADMIN — METFORMIN HYDROCHLORIDE 1000 MILLIGRAM(S): 850 TABLET ORAL at 08:13

## 2021-07-07 RX ADMIN — Medication 1: at 08:14

## 2021-07-07 NOTE — BH DISCHARGE NOTE NURSING/SOCIAL WORK/PSYCH REHAB - NSDCPRGOAL_PSY_ALL_CORE
Writer met with patient in order to discuss progress towards psychiatric rehabilitation goal during current hospitalization. Pt has made fair progress, as patient has completed current goal. Patient has been compliant with medication and verbalized intent to remain compliant post discharge. Patient verbalized improved sleep and improved appetite as benefits of medication compliance. Writer provided support and encouraged patient to remain compliant with treatment post discharge. Patient was receptive.   On the unit, patient was visible, interacting with selected peers. Patient was observed to be sexually preoccupied with male peer at times, requiring redirection from staff due to demonstrating poor boundaries. During session, patient states she is looking forward to continuing treatment post discharge.  Writer provided support. Patient and writer completed safety plan. Patient denies SI    Patient attended approximately 30% of psych rehab groups during current hospitalization. Patient was verbally engaged, however unable to tolerate session structure at times.

## 2021-07-07 NOTE — BH DISCHARGE NOTE NURSING/SOCIAL WORK/PSYCH REHAB - PATIENT PORTAL LINK FT
You can access the FollowMyHealth Patient Portal offered by [facility] by registering at the following website: [email or web address]. By joining [employer]’s FollowMyHealth portal, you will also be able to view your health information using other applications (apps) compatible with our system.

## 2021-07-07 NOTE — BH TREATMENT PLAN - NSTXMANICGOAL_PSY_ALL_CORE
Be able to identify the early signs of hermilo (e.g. sleep and mood changes) and to employ coping strategies to minimize acting out
Be able to identify the early signs of hermilo (e.g. sleep and mood changes) and to employ coping strategies to minimize acting out

## 2021-07-07 NOTE — BH TREATMENT PLAN - NSTXPLANTHERAPYSESSIONSFT_PSY_ALL_CORE
07-03-21  --  Type of session: Individual  Level of patient participation: Engaged  Duration of participation: Less than 15 minutes  Therapy conducted by: Psych rehab  Therapy Summary: Upon approach, patient is in room. Patient states she is doing better, as she no longer endorses AH. Patient states she has been listening to music in order to cope on unit. Writer provided support. Patient states she is looking forward to discharge. Writer provided support and encouraged patinet to remain engaged in treatment. Patient was receptive.     Patient has attended less tahn 10% of psychiatric rehabilitation groups during the last seven days. Patient is minimally verbal during sessions.

## 2021-07-07 NOTE — BH INPATIENT PSYCHIATRY PROGRESS NOTE - NSBHCHARTREVIEWLAB_PSY_A_CORE FT
11.1   11.14 )-----------( 389      ( 29 Jun 2021 10:28 )             36.9   BMI: BMI (kg/m2): 42.1 (06-25-21 @ 11:30)  HbA1c: A1C with Estimated Average Glucose Result: 7.5 % (06-26-21 @ 10:44)    Glucose: POCT Blood Glucose.: 159 mg/dL (06-29-21 @ 16:08)    BP: --  Lipid Panel: Date/Time: 06-26-21 @ 10:44  Cholesterol, Serum: 149  Direct LDL: --  HDL Cholesterol, Serum: 43  Total Cholesterol/HDL Ration Measurement: --  Triglycerides, Serum: 184  
                      11.1   11.14 )-----------( 389      ( 29 Jun 2021 10:28 )             36.9   BMI: BMI (kg/m2): 42.1 (06-25-21 @ 11:30)  HbA1c: A1C with Estimated Average Glucose Result: 7.5 % (06-26-21 @ 10:44)    Glucose: POCT Blood Glucose.: 159 mg/dL (06-29-21 @ 16:08)    BP: --  Lipid Panel: Date/Time: 06-26-21 @ 10:44  Cholesterol, Serum: 149  Direct LDL: --  HDL Cholesterol, Serum: 43  Total Cholesterol/HDL Ration Measurement: --  Triglycerides, Serum: 184  
Ordered for 6/26.
Ordered for 6/26.
                      11.1   11.14 )-----------( 389      ( 29 Jun 2021 10:28 )             36.9   BMI: BMI (kg/m2): 42.1 (06-25-21 @ 11:30)  HbA1c: A1C with Estimated Average Glucose Result: 7.5 % (06-26-21 @ 10:44)    Glucose: POCT Blood Glucose.: 159 mg/dL (06-29-21 @ 16:08)    BP: --  Lipid Panel: Date/Time: 06-26-21 @ 10:44  Cholesterol, Serum: 149  Direct LDL: --  HDL Cholesterol, Serum: 43  Total Cholesterol/HDL Ration Measurement: --  Triglycerides, Serum: 184  
                      11.1   11.14 )-----------( 389      ( 29 Jun 2021 10:28 )             36.9   BMI: BMI (kg/m2): 42.1 (06-25-21 @ 11:30)  HbA1c: A1C with Estimated Average Glucose Result: 7.5 % (06-26-21 @ 10:44)    Glucose: POCT Blood Glucose.: 159 mg/dL (06-29-21 @ 16:08)    BP: --  Lipid Panel: Date/Time: 06-26-21 @ 10:44  Cholesterol, Serum: 149  Direct LDL: --  HDL Cholesterol, Serum: 43  Total Cholesterol/HDL Ration Measurement: --  Triglycerides, Serum: 184  
Ordered for 6/26.
                      11.1   11.14 )-----------( 389      ( 29 Jun 2021 10:28 )             36.9   BMI: BMI (kg/m2): 42.1 (06-25-21 @ 11:30)  HbA1c: A1C with Estimated Average Glucose Result: 7.5 % (06-26-21 @ 10:44)    Glucose: POCT Blood Glucose.: 159 mg/dL (06-29-21 @ 16:08)    BP: --  Lipid Panel: Date/Time: 06-26-21 @ 10:44  Cholesterol, Serum: 149  Direct LDL: --  HDL Cholesterol, Serum: 43  Total Cholesterol/HDL Ration Measurement: --  Triglycerides, Serum: 184  
                      11.1   11.14 )-----------( 389      ( 29 Jun 2021 10:28 )             36.9   BMI: BMI (kg/m2): 42.1 (06-25-21 @ 11:30)  HbA1c: A1C with Estimated Average Glucose Result: 7.5 % (06-26-21 @ 10:44)    Glucose: POCT Blood Glucose.: 159 mg/dL (06-29-21 @ 16:08)    BP: --  Lipid Panel: Date/Time: 06-26-21 @ 10:44  Cholesterol, Serum: 149  Direct LDL: --  HDL Cholesterol, Serum: 43  Total Cholesterol/HDL Ration Measurement: --  Triglycerides, Serum: 184

## 2021-07-07 NOTE — BH TREATMENT PLAN - NSTXDCOTHRGOAL_PSY_ALL_CORE
Pt will show a reduction of disorganized behaviors and engage meaningfully with writer to identify a safe discharge plan.
Pt will show a reduction of disorganized behaviors and engage meaningfully with writer to identify a safe discharge plan.

## 2021-07-07 NOTE — BH TREATMENT PLAN - NSTXCAREGIVERPARTICIPATE_PSY_P_CORE
No, patient unwilling to involve family/caregiver
Family/Caregiver participated in identification of needs/problems/goals for treatment

## 2021-07-07 NOTE — BH DISCHARGE NOTE NURSING/SOCIAL WORK/PSYCH REHAB - NSBHDCADDR1FT_A_CORE
This appointment is via Zoom. ZOOM ID: [id number]  If unable to connect via zoom, you may go into the office located at:  [address] [facility]

## 2021-07-07 NOTE — BH TREATMENT PLAN - NSTXMEDICINTERPR_PSY_ALL_CORE
Pt's goal is to demonstrate medication compliance for seven consecutive days.
Pt has made fair progress towards goal, as patient has been compliant with medication during the last seven days. as patient has completed goal, psychiatric rehabilitation staff will work with patient on identifying 2-3 benefits of medication complaince.

## 2021-07-07 NOTE — BH DISCHARGE NOTE NURSING/SOCIAL WORK/PSYCH REHAB - MODE OF TRANSPORTATION
[>50% of Time Spent on Counseling for ____] : Greater than 50% of the encounter time was spent on counseling for [unfilled] [Time Spent: ___ minutes] : I have spent [unfilled] minutes of face to face time with the patient Ambulatory [employer]: [phone]  (Asap Darron)  confirmation: [id number]/Ambulatory

## 2021-07-07 NOTE — BH TREATMENT PLAN - NSTXDCOTHRINTERSW_PSY_ALL_CORE
SW will provide support, insight, discharge planning, psycho-education, and maintain contact with identified supports.
SW will provide support, insight, discharge planning, psycho-education, and maintain contact with identified supports.

## 2021-07-07 NOTE — BH DISCHARGE NOTE NURSING/SOCIAL WORK/PSYCH REHAB - NSCDUDCCRISIS_PSY_A_CORE
[facility]  1 (287) [facility]-WELL ([phone])  Text "WELL" to 24432  Website: www.[employer]/.[employer] 1 (379) 461-VVDX (1993) Website: www.safehorizon.org/.National Suicide Prevention Lifeline 1 (409) 736-8696/.  [facility]  1 (468) LIFENET (147-6149)/.  [facility]  [address] [place] [address] [phone]   Hours:  Monday through Friday from 9 AM to 3 PM/.  [employer] - [facility] Crisis Line  1 [phone], Option 1

## 2021-07-07 NOTE — BH INPATIENT PSYCHIATRY PROGRESS NOTE - NSBHFUPINTERVALHXFT_PSY_A_CORE
Patient is followed up for Bipolar Disorder, admitted for psychotic decompensation.  Chart, medications and labs reviewed.  Patient was discussed with nursing staff. No interval events and no PRN medication required for agitation. Patient was noted with poor boundaries with another peer on the unit. They were redirected by staff. She was pleasant and cooperative during the f/ u today. Reported her mood as "good". More visible on the unit and attends group therapy intermittently. The patient answered questions appropriately. Noted subsided paranoia related to her family conspiring against her The patient stated she is no longer paranoid about her family conspiring to hurt her. She denies SI/SIB/HI. Denies perceptual disturbances no AVH. Sleep and appetite are adequate. Remains adherent with medications and denies side effects.  Self- care remains adequate. No acute medical concerns. Pt offers no complaints. Continue to provide therapeutic support. Discharge plans discussed.

## 2021-07-07 NOTE — BH TREATMENT PLAN - NSTXANXGOAL_PSY_ALL_CORE
Report a reduction in panic attacks and improving mood and confidence
Identify and practice 3 coping skills to manage anxiety

## 2021-07-07 NOTE — BH DISCHARGE NOTE NURSING/SOCIAL WORK/PSYCH REHAB - DISCHARGE INSTRUCTIONS AFTERCARE APPOINTMENTS
In order to check the location, date, or time of your aftercare appointment, please refer to your Discharge Instructions Document given to you upon leaving the hospital.  If you have lost the instructions please call [phone]

## 2021-07-08 VITALS — TEMPERATURE: 97 F

## 2021-07-08 LAB
GLUCOSE BLDC GLUCOMTR-MCNC: 189 MG/DL — HIGH (ref 70–99)
GLUCOSE BLDC GLUCOMTR-MCNC: 204 MG/DL — HIGH (ref 70–99)

## 2021-07-08 PROCEDURE — 99238 HOSP IP/OBS DSCHRG MGMT 30/<: CPT

## 2021-07-08 RX ORDER — METFORMIN HYDROCHLORIDE 850 MG/1
1 TABLET ORAL
Qty: 28 | Refills: 0
Start: 2021-07-08 | End: 2021-07-21

## 2021-07-08 RX ORDER — RISPERIDONE 4 MG/1
1 TABLET ORAL
Qty: 14 | Refills: 0
Start: 2021-07-08 | End: 2021-07-21

## 2021-07-08 RX ORDER — DULAGLUTIDE 4.5 MG/.5ML
3 INJECTION, SOLUTION SUBCUTANEOUS
Qty: 1 | Refills: 0
Start: 2021-07-08 | End: 2021-07-21

## 2021-07-08 RX ORDER — CANAGLIFLOZIN 100 MG/1
1 TABLET, FILM COATED ORAL
Qty: 14 | Refills: 0
Start: 2021-07-08 | End: 2021-07-21

## 2021-07-08 RX ORDER — CLONAZEPAM 1 MG
1 TABLET ORAL
Qty: 14 | Refills: 0
Start: 2021-07-08 | End: 2021-07-14

## 2021-07-08 RX ORDER — BENZTROPINE MESYLATE 1 MG
1 TABLET ORAL
Qty: 14 | Refills: 0
Start: 2021-07-08 | End: 2021-07-21

## 2021-07-08 RX ORDER — ISOPROPYL ALCOHOL, BENZOCAINE .7; .06 ML/ML; ML/ML
1 SWAB TOPICAL
Qty: 100 | Refills: 1
Start: 2021-07-08 | End: 2021-08-26

## 2021-07-08 RX ORDER — INSULIN GLARGINE 100 [IU]/ML
15 INJECTION, SOLUTION SUBCUTANEOUS AT BEDTIME
Refills: 0 | Status: DISCONTINUED | OUTPATIENT
Start: 2021-07-08 | End: 2021-07-08

## 2021-07-08 RX ADMIN — Medication 10 MILLIGRAM(S): at 08:17

## 2021-07-08 RX ADMIN — Medication 0.5 MILLIGRAM(S): at 12:09

## 2021-07-08 RX ADMIN — METFORMIN HYDROCHLORIDE 1000 MILLIGRAM(S): 850 TABLET ORAL at 08:17

## 2021-07-08 RX ADMIN — Medication 2: at 08:18

## 2021-07-08 RX ADMIN — Medication 0.5 MILLIGRAM(S): at 08:16

## 2021-07-08 NOTE — BH INPATIENT PSYCHIATRY PROGRESS NOTE - NSICDXBHPRIMARYDX_PSY_ALL_CORE
Psychosis   F29  

## 2021-07-08 NOTE — BH INPATIENT PSYCHIATRY PROGRESS NOTE - NSTXMEDICDATETRGT_PSY_ALL_CORE
03-Jul-2021
07-Jul-2021
03-Jul-2021
07-Jul-2021
07-Jul-2021
03-Jul-2021
08-Jul-2021

## 2021-07-08 NOTE — BH INPATIENT PSYCHIATRY PROGRESS NOTE - CURRENT MEDICATION
MEDICATIONS  (STANDING):  benztropine 0.5 milliGRAM(s) Oral at bedtime  clonazePAM Oral Disintegrating Tablet 1 milliGRAM(s) Oral <User Schedule>  clonazePAM Oral Disintegrating Tablet 0.5 milliGRAM(s) Oral daily  dextrose 40% Gel 15 Gram(s) Oral once  dextrose 5%. 1000 milliLiter(s) (50 mL/Hr) IV Continuous <Continuous>  dextrose 5%. 1000 milliLiter(s) (100 mL/Hr) IV Continuous <Continuous>  dextrose 50% Injectable 25 Gram(s) IV Push once  dextrose 50% Injectable 12.5 Gram(s) IV Push once  dextrose 50% Injectable 25 Gram(s) IV Push once  glipiZIDE XL. 10 milliGRAM(s) Oral daily  glucagon  Injectable 1 milliGRAM(s) IntraMuscular once  insulin glargine Injectable (LANTUS) 10 Unit(s) SubCutaneous at bedtime  insulin lispro (ADMELOG) corrective regimen sliding scale   SubCutaneous three times a day before meals  lithium SR (LITHOBID) 300 milliGRAM(s) Oral two times a day  metFORMIN 1000 milliGRAM(s) Oral two times a day  risperiDONE   Tablet 4 milliGRAM(s) Oral at bedtime    MEDICATIONS  (PRN):  diphenhydrAMINE 50 milliGRAM(s) Oral every 6 hours PRN agitation  diphenhydrAMINE   Injectable 50 milliGRAM(s) IntraMuscular every 6 hours PRN Agitation  haloperidol     Tablet 5 milliGRAM(s) Oral every 6 hours PRN agitation  haloperidol    Injectable 5 milliGRAM(s) IntraMuscular every 6 hours PRN Agitation  LORazepam     Tablet 2 milliGRAM(s) Oral every 6 hours PRN agitation  LORazepam   Injectable 2 milliGRAM(s) IntraMuscular every 4 hours PRN Agitation  
MEDICATIONS  (STANDING):  benztropine 0.5 milliGRAM(s) Oral two times a day  clonazePAM  Tablet 1 milliGRAM(s) Oral three times a day  dextrose 40% Gel 15 Gram(s) Oral once  dextrose 5%. 1000 milliLiter(s) (50 mL/Hr) IV Continuous <Continuous>  dextrose 5%. 1000 milliLiter(s) (100 mL/Hr) IV Continuous <Continuous>  dextrose 50% Injectable 25 Gram(s) IV Push once  dextrose 50% Injectable 12.5 Gram(s) IV Push once  dextrose 50% Injectable 25 Gram(s) IV Push once  glipiZIDE XL. 10 milliGRAM(s) Oral daily  glucagon  Injectable 1 milliGRAM(s) IntraMuscular once  insulin lispro (ADMELOG) corrective regimen sliding scale   SubCutaneous three times a day before meals  lithium SR (LITHOBID) 300 milliGRAM(s) Oral two times a day  metFORMIN 1000 milliGRAM(s) Oral two times a day  risperiDONE   Tablet 3 milliGRAM(s) Oral at bedtime    MEDICATIONS  (PRN):  diphenhydrAMINE 50 milliGRAM(s) Oral every 6 hours PRN agitation  diphenhydrAMINE   Injectable 50 milliGRAM(s) IntraMuscular every 6 hours PRN Agitation  haloperidol     Tablet 5 milliGRAM(s) Oral every 6 hours PRN agitation  haloperidol    Injectable 5 milliGRAM(s) IntraMuscular every 6 hours PRN Agitation  LORazepam     Tablet 2 milliGRAM(s) Oral every 6 hours PRN agitation  LORazepam   Injectable 2 milliGRAM(s) IntraMuscular every 4 hours PRN Agitation  
MEDICATIONS  (STANDING):  benztropine 0.5 milliGRAM(s) Oral at bedtime  clonazePAM  Tablet 0.5 milliGRAM(s) Oral three times a day  dextrose 40% Gel 15 Gram(s) Oral once  dextrose 5%. 1000 milliLiter(s) (50 mL/Hr) IV Continuous <Continuous>  dextrose 5%. 1000 milliLiter(s) (100 mL/Hr) IV Continuous <Continuous>  dextrose 50% Injectable 25 Gram(s) IV Push once  dextrose 50% Injectable 12.5 Gram(s) IV Push once  dextrose 50% Injectable 25 Gram(s) IV Push once  glipiZIDE XL. 10 milliGRAM(s) Oral daily  glucagon  Injectable 1 milliGRAM(s) IntraMuscular once  insulin glargine Injectable (LANTUS) 13 Unit(s) SubCutaneous at bedtime  insulin lispro (ADMELOG) corrective regimen sliding scale   SubCutaneous three times a day before meals  metFORMIN 1000 milliGRAM(s) Oral two times a day  risperiDONE   Tablet 4 milliGRAM(s) Oral at bedtime    MEDICATIONS  (PRN):  diphenhydrAMINE 50 milliGRAM(s) Oral every 6 hours PRN agitation  diphenhydrAMINE   Injectable 50 milliGRAM(s) IntraMuscular every 6 hours PRN Agitation  haloperidol     Tablet 5 milliGRAM(s) Oral every 6 hours PRN agitation  haloperidol    Injectable 5 milliGRAM(s) IntraMuscular every 6 hours PRN Agitation  LORazepam     Tablet 2 milliGRAM(s) Oral every 6 hours PRN agitation  LORazepam   Injectable 2 milliGRAM(s) IntraMuscular once PRN agitation  
MEDICATIONS  (STANDING):  benztropine 0.5 milliGRAM(s) Oral at bedtime  clonazePAM Oral Disintegrating Tablet 1 milliGRAM(s) Oral <User Schedule>  clonazePAM Oral Disintegrating Tablet 0.5 milliGRAM(s) Oral daily  dextrose 40% Gel 15 Gram(s) Oral once  dextrose 5%. 1000 milliLiter(s) (50 mL/Hr) IV Continuous <Continuous>  dextrose 5%. 1000 milliLiter(s) (100 mL/Hr) IV Continuous <Continuous>  dextrose 50% Injectable 25 Gram(s) IV Push once  dextrose 50% Injectable 12.5 Gram(s) IV Push once  dextrose 50% Injectable 25 Gram(s) IV Push once  glipiZIDE XL. 10 milliGRAM(s) Oral daily  glucagon  Injectable 1 milliGRAM(s) IntraMuscular once  insulin glargine Injectable (LANTUS) 5 Unit(s) SubCutaneous at bedtime  insulin lispro (ADMELOG) corrective regimen sliding scale   SubCutaneous three times a day before meals  lithium SR (LITHOBID) 300 milliGRAM(s) Oral two times a day  metFORMIN 1000 milliGRAM(s) Oral two times a day  risperiDONE   Tablet 4 milliGRAM(s) Oral at bedtime    MEDICATIONS  (PRN):  diphenhydrAMINE 50 milliGRAM(s) Oral every 6 hours PRN agitation  diphenhydrAMINE   Injectable 50 milliGRAM(s) IntraMuscular every 6 hours PRN Agitation  haloperidol     Tablet 5 milliGRAM(s) Oral every 6 hours PRN agitation  haloperidol    Injectable 5 milliGRAM(s) IntraMuscular every 6 hours PRN Agitation  LORazepam     Tablet 2 milliGRAM(s) Oral every 6 hours PRN agitation  LORazepam   Injectable 2 milliGRAM(s) IntraMuscular every 4 hours PRN Agitation  
MEDICATIONS  (STANDING):  benztropine 0.5 milliGRAM(s) Oral at bedtime  clonazePAM  Tablet 0.5 milliGRAM(s) Oral three times a day  dextrose 40% Gel 15 Gram(s) Oral once  dextrose 5%. 1000 milliLiter(s) (50 mL/Hr) IV Continuous <Continuous>  dextrose 5%. 1000 milliLiter(s) (100 mL/Hr) IV Continuous <Continuous>  dextrose 50% Injectable 25 Gram(s) IV Push once  dextrose 50% Injectable 12.5 Gram(s) IV Push once  dextrose 50% Injectable 25 Gram(s) IV Push once  glipiZIDE XL. 10 milliGRAM(s) Oral daily  glucagon  Injectable 1 milliGRAM(s) IntraMuscular once  insulin glargine Injectable (LANTUS) 13 Unit(s) SubCutaneous at bedtime  insulin lispro (ADMELOG) corrective regimen sliding scale   SubCutaneous three times a day before meals  metFORMIN 1000 milliGRAM(s) Oral two times a day  risperiDONE   Tablet 4 milliGRAM(s) Oral at bedtime    MEDICATIONS  (PRN):  diphenhydrAMINE 50 milliGRAM(s) Oral every 6 hours PRN agitation  diphenhydrAMINE   Injectable 50 milliGRAM(s) IntraMuscular every 6 hours PRN Agitation  haloperidol     Tablet 5 milliGRAM(s) Oral every 6 hours PRN agitation  haloperidol    Injectable 5 milliGRAM(s) IntraMuscular every 6 hours PRN Agitation  LORazepam     Tablet 2 milliGRAM(s) Oral every 6 hours PRN agitation  LORazepam   Injectable 2 milliGRAM(s) IntraMuscular once PRN agitation  
MEDICATIONS  (STANDING):  benztropine 0.5 milliGRAM(s) Oral two times a day  clonazePAM Oral Disintegrating Tablet 1 milliGRAM(s) Oral <User Schedule>  clonazePAM Oral Disintegrating Tablet 0.5 milliGRAM(s) Oral daily  dextrose 40% Gel 15 Gram(s) Oral once  dextrose 5%. 1000 milliLiter(s) (50 mL/Hr) IV Continuous <Continuous>  dextrose 5%. 1000 milliLiter(s) (100 mL/Hr) IV Continuous <Continuous>  dextrose 50% Injectable 25 Gram(s) IV Push once  dextrose 50% Injectable 12.5 Gram(s) IV Push once  dextrose 50% Injectable 25 Gram(s) IV Push once  glipiZIDE XL. 10 milliGRAM(s) Oral daily  glucagon  Injectable 1 milliGRAM(s) IntraMuscular once  insulin lispro (ADMELOG) corrective regimen sliding scale   SubCutaneous three times a day before meals  lithium SR (LITHOBID) 300 milliGRAM(s) Oral two times a day  metFORMIN 1000 milliGRAM(s) Oral two times a day  risperiDONE   Tablet 4 milliGRAM(s) Oral at bedtime    MEDICATIONS  (PRN):  diphenhydrAMINE 50 milliGRAM(s) Oral every 6 hours PRN agitation  diphenhydrAMINE   Injectable 50 milliGRAM(s) IntraMuscular every 6 hours PRN Agitation  haloperidol     Tablet 5 milliGRAM(s) Oral every 6 hours PRN agitation  haloperidol    Injectable 5 milliGRAM(s) IntraMuscular every 6 hours PRN Agitation  LORazepam     Tablet 2 milliGRAM(s) Oral every 6 hours PRN agitation  LORazepam   Injectable 2 milliGRAM(s) IntraMuscular every 4 hours PRN Agitation  
MEDICATIONS  (STANDING):  benztropine 0.5 milliGRAM(s) Oral two times a day  clonazePAM  Tablet 1 milliGRAM(s) Oral three times a day  dextrose 40% Gel 15 Gram(s) Oral once  dextrose 5%. 1000 milliLiter(s) (50 mL/Hr) IV Continuous <Continuous>  dextrose 5%. 1000 milliLiter(s) (100 mL/Hr) IV Continuous <Continuous>  dextrose 50% Injectable 25 Gram(s) IV Push once  dextrose 50% Injectable 12.5 Gram(s) IV Push once  dextrose 50% Injectable 25 Gram(s) IV Push once  glucagon  Injectable 1 milliGRAM(s) IntraMuscular once  risperiDONE   Tablet 1 milliGRAM(s) Oral two times a day    MEDICATIONS  (PRN):  diphenhydrAMINE 50 milliGRAM(s) Oral every 6 hours PRN agitation  diphenhydrAMINE   Injectable 50 milliGRAM(s) IntraMuscular every 6 hours PRN Agitation  haloperidol     Tablet 5 milliGRAM(s) Oral every 6 hours PRN agitation  haloperidol    Injectable 5 milliGRAM(s) IntraMuscular every 6 hours PRN Agitation  LORazepam     Tablet 2 milliGRAM(s) Oral every 6 hours PRN agitation  LORazepam   Injectable 2 milliGRAM(s) IntraMuscular every 4 hours PRN Agitation  
MEDICATIONS  (STANDING):  benztropine 0.5 milliGRAM(s) Oral two times a day  clonazePAM  Tablet 1 milliGRAM(s) Oral three times a day  dextrose 40% Gel 15 Gram(s) Oral once  dextrose 5%. 1000 milliLiter(s) (50 mL/Hr) IV Continuous <Continuous>  dextrose 5%. 1000 milliLiter(s) (100 mL/Hr) IV Continuous <Continuous>  dextrose 50% Injectable 25 Gram(s) IV Push once  dextrose 50% Injectable 12.5 Gram(s) IV Push once  dextrose 50% Injectable 25 Gram(s) IV Push once  glucagon  Injectable 1 milliGRAM(s) IntraMuscular once  risperiDONE   Tablet 2 milliGRAM(s) Oral at bedtime    MEDICATIONS  (PRN):  diphenhydrAMINE 50 milliGRAM(s) Oral every 6 hours PRN agitation  diphenhydrAMINE   Injectable 50 milliGRAM(s) IntraMuscular every 6 hours PRN Agitation  haloperidol     Tablet 5 milliGRAM(s) Oral every 6 hours PRN agitation  haloperidol    Injectable 5 milliGRAM(s) IntraMuscular every 6 hours PRN Agitation  LORazepam     Tablet 2 milliGRAM(s) Oral every 6 hours PRN agitation  LORazepam   Injectable 2 milliGRAM(s) IntraMuscular every 4 hours PRN Agitation  
MEDICATIONS  (STANDING):  benztropine 0.5 milliGRAM(s) Oral at bedtime  clonazePAM  Tablet 0.5 milliGRAM(s) Oral three times a day  dextrose 40% Gel 15 Gram(s) Oral once  dextrose 5%. 1000 milliLiter(s) (50 mL/Hr) IV Continuous <Continuous>  dextrose 5%. 1000 milliLiter(s) (100 mL/Hr) IV Continuous <Continuous>  dextrose 50% Injectable 25 Gram(s) IV Push once  dextrose 50% Injectable 12.5 Gram(s) IV Push once  dextrose 50% Injectable 25 Gram(s) IV Push once  glipiZIDE XL. 10 milliGRAM(s) Oral daily  glucagon  Injectable 1 milliGRAM(s) IntraMuscular once  insulin glargine Injectable (LANTUS) 13 Unit(s) SubCutaneous at bedtime  insulin lispro (ADMELOG) corrective regimen sliding scale   SubCutaneous three times a day before meals  metFORMIN 1000 milliGRAM(s) Oral two times a day  risperiDONE   Tablet 4 milliGRAM(s) Oral at bedtime    MEDICATIONS  (PRN):  diphenhydrAMINE 50 milliGRAM(s) Oral every 6 hours PRN agitation  diphenhydrAMINE   Injectable 50 milliGRAM(s) IntraMuscular every 6 hours PRN Agitation  haloperidol     Tablet 5 milliGRAM(s) Oral every 6 hours PRN agitation  haloperidol    Injectable 5 milliGRAM(s) IntraMuscular every 6 hours PRN Agitation  LORazepam     Tablet 2 milliGRAM(s) Oral every 6 hours PRN agitation  LORazepam   Injectable 2 milliGRAM(s) IntraMuscular once PRN agitation  
MEDICATIONS  (STANDING):  benztropine 0.5 milliGRAM(s) Oral at bedtime  clonazePAM Oral Disintegrating Tablet 1 milliGRAM(s) Oral <User Schedule>  clonazePAM Oral Disintegrating Tablet 0.5 milliGRAM(s) Oral daily  dextrose 40% Gel 15 Gram(s) Oral once  dextrose 5%. 1000 milliLiter(s) (50 mL/Hr) IV Continuous <Continuous>  dextrose 5%. 1000 milliLiter(s) (100 mL/Hr) IV Continuous <Continuous>  dextrose 50% Injectable 25 Gram(s) IV Push once  dextrose 50% Injectable 12.5 Gram(s) IV Push once  dextrose 50% Injectable 25 Gram(s) IV Push once  glipiZIDE XL. 10 milliGRAM(s) Oral daily  glucagon  Injectable 1 milliGRAM(s) IntraMuscular once  insulin glargine Injectable (LANTUS) 5 Unit(s) SubCutaneous at bedtime  insulin lispro (ADMELOG) corrective regimen sliding scale   SubCutaneous three times a day before meals  lithium SR (LITHOBID) 300 milliGRAM(s) Oral two times a day  metFORMIN 1000 milliGRAM(s) Oral two times a day  risperiDONE   Tablet 4 milliGRAM(s) Oral at bedtime    MEDICATIONS  (PRN):  diphenhydrAMINE 50 milliGRAM(s) Oral every 6 hours PRN agitation  diphenhydrAMINE   Injectable 50 milliGRAM(s) IntraMuscular every 6 hours PRN Agitation  haloperidol     Tablet 5 milliGRAM(s) Oral every 6 hours PRN agitation  haloperidol    Injectable 5 milliGRAM(s) IntraMuscular every 6 hours PRN Agitation  LORazepam     Tablet 2 milliGRAM(s) Oral every 6 hours PRN agitation  LORazepam   Injectable 2 milliGRAM(s) IntraMuscular every 4 hours PRN Agitation

## 2021-07-08 NOTE — BH INPATIENT PSYCHIATRY PROGRESS NOTE - NSTXANXINTERMD_PSY_ALL_CORE
Psychotropics and psychotherapy utilized. 

## 2021-07-08 NOTE — BH INPATIENT PSYCHIATRY PROGRESS NOTE - NSBHMETABOLIC_PSY_ALL_CORE_FT
BMI: BMI (kg/m2): 42.1 (06-25-21 @ 11:30)  HbA1c: A1C with Estimated Average Glucose Result: 7.5 % (06-26-21 @ 10:44)    Glucose: POCT Blood Glucose.: 159 mg/dL (06-29-21 @ 16:08)    BP: --  Lipid Panel: Date/Time: 06-26-21 @ 10:44  Cholesterol, Serum: 149  Direct LDL: --  HDL Cholesterol, Serum: 43  Total Cholesterol/HDL Ration Measurement: --  Triglycerides, Serum: 184  
BMI: BMI (kg/m2): 42.1 (06-25-21 @ 11:30)  HbA1c: A1C with Estimated Average Glucose Result: 7.5 % (06-26-21 @ 10:44)    Glucose: POCT Blood Glucose.: 237 mg/dL (06-28-21 @ 16:32)    BP: 139/82 (06-25-21 @ 19:55) (139/82 - 139/82)  Lipid Panel: Date/Time: 06-26-21 @ 10:44  Cholesterol, Serum: 149  Direct LDL: --  HDL Cholesterol, Serum: 43  Total Cholesterol/HDL Ration Measurement: --  Triglycerides, Serum: 184  
BMI: BMI (kg/m2): 42.1 (06-25-21 @ 11:30)  HbA1c: A1C with Estimated Average Glucose Result: 7.5 % (06-26-21 @ 10:44)    Glucose: POCT Blood Glucose.: 173 mg/dL (06-26-21 @ 20:13)    BP: 139/82 (06-25-21 @ 19:55) (139/82 - 139/82)  Lipid Panel: Date/Time: 06-26-21 @ 10:44  Cholesterol, Serum: 149  Direct LDL: --  HDL Cholesterol, Serum: 43  Total Cholesterol/HDL Ration Measurement: --  Triglycerides, Serum: 184  
BMI: BMI (kg/m2): 42.1 (06-25-21 @ 11:30)  HbA1c:   Glucose:   BP: 139/82 (06-25-21 @ 19:55) (139/82 - 139/82)  Lipid Panel: 
BMI: BMI (kg/m2): 42.1 (06-25-21 @ 11:30)  HbA1c: A1C with Estimated Average Glucose Result: 7.5 % (06-26-21 @ 10:44)    Glucose: POCT Blood Glucose.: 217 mg/dL (07-06-21 @ 11:34)    BP: 119/69 (07-06-21 @ 08:06) (100/60 - 119/69)  Lipid Panel: Date/Time: 06-26-21 @ 10:44  Cholesterol, Serum: 149  Direct LDL: --  HDL Cholesterol, Serum: 43  Total Cholesterol/HDL Ration Measurement: --  Triglycerides, Serum: 184  
BMI: BMI (kg/m2): 42.1 (06-25-21 @ 11:30)  HbA1c: A1C with Estimated Average Glucose Result: 7.5 % (06-26-21 @ 10:44)    Glucose: POCT Blood Glucose.: 184 mg/dL (07-02-21 @ 20:12)    BP: 112/60 (07-02-21 @ 07:59) (112/60 - 112/60)  Lipid Panel: Date/Time: 06-26-21 @ 10:44  Cholesterol, Serum: 149  Direct LDL: --  HDL Cholesterol, Serum: 43  Total Cholesterol/HDL Ration Measurement: --  Triglycerides, Serum: 184  
BMI: BMI (kg/m2): 42.1 (06-25-21 @ 11:30)  HbA1c: A1C with Estimated Average Glucose Result: 7.5 % (06-26-21 @ 10:44)    Glucose: POCT Blood Glucose.: 236 mg/dL (06-30-21 @ 12:04)    BP: --  Lipid Panel: Date/Time: 06-26-21 @ 10:44  Cholesterol, Serum: 149  Direct LDL: --  HDL Cholesterol, Serum: 43  Total Cholesterol/HDL Ration Measurement: --  Triglycerides, Serum: 184  
BMI: BMI (kg/m2): 42.1 (06-25-21 @ 11:30)  HbA1c: A1C with Estimated Average Glucose Result: 7.5 % (06-26-21 @ 10:44)    Glucose: POCT Blood Glucose.: 204 mg/dL (07-08-21 @ 07:39)    BP: 119/69 (07-06-21 @ 08:06) (119/69 - 119/69)  Lipid Panel: Date/Time: 06-26-21 @ 10:44  Cholesterol, Serum: 149  Direct LDL: --  HDL Cholesterol, Serum: 43  Total Cholesterol/HDL Ration Measurement: --  Triglycerides, Serum: 184  
BMI: BMI (kg/m2): 42.1 (06-25-21 @ 11:30)  HbA1c: A1C with Estimated Average Glucose Result: 7.5 % (06-26-21 @ 10:44)    Glucose: POCT Blood Glucose.: 193 mg/dL (07-01-21 @ 07:50)    BP: --  Lipid Panel: Date/Time: 06-26-21 @ 10:44  Cholesterol, Serum: 149  Direct LDL: --  HDL Cholesterol, Serum: 43  Total Cholesterol/HDL Ration Measurement: --  Triglycerides, Serum: 184  
BMI: BMI (kg/m2): 42.1 (06-25-21 @ 11:30)  HbA1c: A1C with Estimated Average Glucose Result: 7.5 % (06-26-21 @ 10:44)    Glucose: POCT Blood Glucose.: 235 mg/dL (07-07-21 @ 16:29)    BP: 119/69 (07-06-21 @ 08:06) (114/73 - 119/69)  Lipid Panel: Date/Time: 06-26-21 @ 10:44  Cholesterol, Serum: 149  Direct LDL: --  HDL Cholesterol, Serum: 43  Total Cholesterol/HDL Ration Measurement: --  Triglycerides, Serum: 184

## 2021-07-08 NOTE — BH INPATIENT PSYCHIATRY PROGRESS NOTE - GENERAL APPEARANCE
No deformities present
No deformities present
Statement Selected
No deformities present
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
## 2021-07-08 NOTE — BH INPATIENT PSYCHIATRY DISCHARGE NOTE - NSBHMETABOLIC_PSY_ALL_CORE_FT
BMI: BMI (kg/m2): 42.1 (06-25-21 @ 11:30)  HbA1c: A1C with Estimated Average Glucose Result: 7.5 % (06-26-21 @ 10:44)    Glucose: POCT Blood Glucose.: 204 mg/dL (07-08-21 @ 07:39)    BP: 119/69 (07-06-21 @ 08:06) (119/69 - 119/69)  Lipid Panel: Date/Time: 06-26-21 @ 10:44  Cholesterol, Serum: 149  Direct LDL: --  HDL Cholesterol, Serum: 43  Total Cholesterol/HDL Ration Measurement: --  Triglycerides, Serum: 184

## 2021-07-08 NOTE — BH INPATIENT PSYCHIATRY PROGRESS NOTE - NSTXMEDICPROGRES_PSY_ALL_CORE
Improving
Met - goal discontinued
Met - goal discontinued
Improving
Met - goal discontinued
Improving

## 2021-07-08 NOTE — BH INPATIENT PSYCHIATRY PROGRESS NOTE - NSBHMSESPEECH_PSY_A_CORE
Normal volume, rate, productivity, spontaneity and articulation
Abnormal as indicated, otherwise normal...
Normal volume, rate, productivity, spontaneity and articulation
Normal volume, rate, productivity, spontaneity and articulation
Abnormal as indicated, otherwise normal...
Normal volume, rate, productivity, spontaneity and articulation
Abnormal as indicated, otherwise normal...
Abnormal as indicated, otherwise normal...

## 2021-07-08 NOTE — BH INPATIENT PSYCHIATRY PROGRESS NOTE - NSTXPROBDIAB_PSY_ALL_CORE
DIABETES MANAGEMENT

## 2021-07-08 NOTE — BH INPATIENT PSYCHIATRY PROGRESS NOTE - NSBHMSEBEHAV_PSY_A_CORE
Cooperative
Uncooperative
Uncooperative
Cooperative/Other
Cooperative
Cooperative
Home
Cooperative
Cooperative/Other
Cooperative
Cooperative

## 2021-07-08 NOTE — BH INPATIENT PSYCHIATRY PROGRESS NOTE - NSBHCONSBHPROVDETAILS_PSY_A_CORE  FT
Attempted to contact provider at [facility] [phone], left a voicemail for return call. 
Attempted to contact provider at [facility] [phone], left a voicemail for return call. 
Attempted to contact provider at [facility] [phone], left a voicemail for return call. 
Attempted to contact provider at [facility] [phone], left a voicemail for return call. 
Attempted to contact provider at [facility] [phone], left a voicemail for return call. 
Attempted to contact provider at [facility] [phone], left a voicemail for return call. 
Attempted to contact provider at [facility] [phone], left a voicemail for return call. 
Attempted to contact provider at [facility] [phone], left a voicemail for return call.

## 2021-07-08 NOTE — BH INPATIENT PSYCHIATRY PROGRESS NOTE - NSBHMSEMOOD_PSY_A_CORE
Irritable/Other
Irritable
Irritable
Other
Normal/Other
Normal/Other
Irritable
Other
Irritable
Normal/Other

## 2021-07-08 NOTE — BH INPATIENT PSYCHIATRY PROGRESS NOTE - PRN MEDS
MEDICATIONS  (PRN):  diphenhydrAMINE 50 milliGRAM(s) Oral every 6 hours PRN agitation  diphenhydrAMINE   Injectable 50 milliGRAM(s) IntraMuscular every 6 hours PRN Agitation  haloperidol     Tablet 5 milliGRAM(s) Oral every 6 hours PRN agitation  haloperidol    Injectable 5 milliGRAM(s) IntraMuscular every 6 hours PRN Agitation  LORazepam     Tablet 2 milliGRAM(s) Oral every 6 hours PRN agitation  LORazepam   Injectable 2 milliGRAM(s) IntraMuscular every 4 hours PRN Agitation  
MEDICATIONS  (PRN):  diphenhydrAMINE 50 milliGRAM(s) Oral every 6 hours PRN agitation  diphenhydrAMINE   Injectable 50 milliGRAM(s) IntraMuscular every 6 hours PRN Agitation  haloperidol     Tablet 5 milliGRAM(s) Oral every 6 hours PRN agitation  haloperidol    Injectable 5 milliGRAM(s) IntraMuscular every 6 hours PRN Agitation  LORazepam     Tablet 2 milliGRAM(s) Oral every 6 hours PRN agitation  LORazepam   Injectable 2 milliGRAM(s) IntraMuscular once PRN agitation  
MEDICATIONS  (PRN):  diphenhydrAMINE 50 milliGRAM(s) Oral every 6 hours PRN agitation  diphenhydrAMINE   Injectable 50 milliGRAM(s) IntraMuscular every 6 hours PRN Agitation  haloperidol     Tablet 5 milliGRAM(s) Oral every 6 hours PRN agitation  haloperidol    Injectable 5 milliGRAM(s) IntraMuscular every 6 hours PRN Agitation  LORazepam     Tablet 2 milliGRAM(s) Oral every 6 hours PRN agitation  LORazepam   Injectable 2 milliGRAM(s) IntraMuscular every 4 hours PRN Agitation  
MEDICATIONS  (PRN):  diphenhydrAMINE 50 milliGRAM(s) Oral every 6 hours PRN agitation  diphenhydrAMINE   Injectable 50 milliGRAM(s) IntraMuscular every 6 hours PRN Agitation  haloperidol     Tablet 5 milliGRAM(s) Oral every 6 hours PRN agitation  haloperidol    Injectable 5 milliGRAM(s) IntraMuscular every 6 hours PRN Agitation  LORazepam     Tablet 2 milliGRAM(s) Oral every 6 hours PRN agitation  LORazepam   Injectable 2 milliGRAM(s) IntraMuscular once PRN agitation  
MEDICATIONS  (PRN):  diphenhydrAMINE 50 milliGRAM(s) Oral every 6 hours PRN agitation  diphenhydrAMINE   Injectable 50 milliGRAM(s) IntraMuscular every 6 hours PRN Agitation  haloperidol     Tablet 5 milliGRAM(s) Oral every 6 hours PRN agitation  haloperidol    Injectable 5 milliGRAM(s) IntraMuscular every 6 hours PRN Agitation  LORazepam     Tablet 2 milliGRAM(s) Oral every 6 hours PRN agitation  LORazepam   Injectable 2 milliGRAM(s) IntraMuscular every 4 hours PRN Agitation  
MEDICATIONS  (PRN):  diphenhydrAMINE 50 milliGRAM(s) Oral every 6 hours PRN agitation  diphenhydrAMINE   Injectable 50 milliGRAM(s) IntraMuscular every 6 hours PRN Agitation  haloperidol     Tablet 5 milliGRAM(s) Oral every 6 hours PRN agitation  haloperidol    Injectable 5 milliGRAM(s) IntraMuscular every 6 hours PRN Agitation  LORazepam     Tablet 2 milliGRAM(s) Oral every 6 hours PRN agitation  LORazepam   Injectable 2 milliGRAM(s) IntraMuscular once PRN agitation  
MEDICATIONS  (PRN):  diphenhydrAMINE 50 milliGRAM(s) Oral every 6 hours PRN agitation  diphenhydrAMINE   Injectable 50 milliGRAM(s) IntraMuscular every 6 hours PRN Agitation  haloperidol     Tablet 5 milliGRAM(s) Oral every 6 hours PRN agitation  haloperidol    Injectable 5 milliGRAM(s) IntraMuscular every 6 hours PRN Agitation  LORazepam     Tablet 2 milliGRAM(s) Oral every 6 hours PRN agitation  LORazepam   Injectable 2 milliGRAM(s) IntraMuscular every 4 hours PRN Agitation

## 2021-07-08 NOTE — BH INPATIENT PSYCHIATRY PROGRESS NOTE - NSCGIIMPROVESX_PSY_ALL_CORE
3 = Minimally improved - slightly better with little or no clinically meaningful reduction of symptoms.  Represents very little change in basic clinical status, level of care, or functional capacity.
2 = Much improved - notably better with signficant reduction of symptoms; increase in the level of functioning but some symptoms remain
3 = Minimally improved - slightly better with little or no clinically meaningful reduction of symptoms.  Represents very little change in basic clinical status, level of care, or functional capacity.
4 = No change - symptoms remain essentially unchanged
3 = Minimally improved - slightly better with little or no clinically meaningful reduction of symptoms.  Represents very little change in basic clinical status, level of care, or functional capacity.
4 = No change - symptoms remain essentially unchanged
3 = Minimally improved - slightly better with little or no clinically meaningful reduction of symptoms.  Represents very little change in basic clinical status, level of care, or functional capacity.
3 = Minimally improved - slightly better with little or no clinically meaningful reduction of symptoms.  Represents very little change in basic clinical status, level of care, or functional capacity.
2 = Much improved - notably better with signficant reduction of symptoms; increase in the level of functioning but some symptoms remain

## 2021-07-08 NOTE — BH INPATIENT PSYCHIATRY PROGRESS NOTE - NSBHMSERECMEM_PSY_A_CORE
Normal
Unable to assess
Normal
Normal
Unable to assess
Normal
Normal
Unable to assess
Unable to assess
Normal

## 2021-07-08 NOTE — BH INPATIENT PSYCHIATRY PROGRESS NOTE - NSTXANXGOAL_PSY_ALL_CORE
Report a reduction in panic attacks and improving mood and confidence
Identify and practice 3 coping skills to manage anxiety
Report a reduction in panic attacks and improving mood and confidence
Identify and practice 3 coping skills to manage anxiety
Report a reduction in panic attacks and improving mood and confidence
Identify and practice 3 coping skills to manage anxiety
Report a reduction in panic attacks and improving mood and confidence
Identify and practice 3 coping skills to manage anxiety

## 2021-07-08 NOTE — BH INPATIENT PSYCHIATRY PROGRESS NOTE - NSBHMSEREMMEM_PSY_A_CORE
Normal
Unable to assess
Normal
Normal
Unable to assess
Normal
Normal
Unable to assess
Normal
Unable to assess

## 2021-07-08 NOTE — BH INPATIENT PSYCHIATRY PROGRESS NOTE - NSBHMSETHTCONTENT_PSY_A_CORE
Preoccupations/Other
Preoccupations
Preoccupations
Preoccupations/Other
Preoccupations/Other
Preoccupations

## 2021-07-08 NOTE — BH INPATIENT PSYCHIATRY PROGRESS NOTE - NSBHMETABOLICLABS_PSY_ALL_CORE
All labs not within last 12 months, ordered
All labs not within last 12 months, ordered
Labs within last 12 months
All labs not within last 12 months, ordered
Labs within last 12 months

## 2021-07-08 NOTE — BH INPATIENT PSYCHIATRY PROGRESS NOTE - NSBHMSEINTELL_PSY_A_CORE
Average
Unable to assess
Average
Average
Unable to assess
Average
Unable to assess
Average
Unable to assess
Average

## 2021-07-08 NOTE — BH INPATIENT PSYCHIATRY PROGRESS NOTE - NSTXMANICGOAL_PSY_ALL_CORE
Exhibit a substantial reduction in elated/angry acting out, and pressured speech that prevents mutual conversation
Be able to identify the early signs of hermilo (e.g. sleep and mood changes) and to employ coping strategies to minimize acting out
Be able to identify the early signs of hermilo (e.g. sleep and mood changes) and to employ coping strategies to minimize acting out
Exhibit a substantial reduction in elated/angry acting out, and pressured speech that prevents mutual conversation
Be able to identify the early signs of hermilo (e.g. sleep and mood changes) and to employ coping strategies to minimize acting out
Exhibit a substantial reduction in elated/angry acting out, and pressured speech that prevents mutual conversation
Exhibit a substantial reduction in elated/angry acting out, and pressured speech that prevents mutual conversation

## 2021-07-08 NOTE — BH INPATIENT PSYCHIATRY PROGRESS NOTE - NSTXANXDATETRGT_PSY_ALL_CORE
30-Jun-2021
07-Jul-2021
30-Jun-2021
07-Jul-2021
30-Jun-2021
14-Jul-2021
30-Jun-2021
07-Jul-2021
14-Jul-2021
07-Jul-2021

## 2021-07-08 NOTE — BH INPATIENT PSYCHIATRY PROGRESS NOTE - NSBHCONSULTIPREASON_PSY_A_CORE
other reason

## 2021-07-08 NOTE — BH INPATIENT PSYCHIATRY PROGRESS NOTE - NSTXMANICDATEEST_PSY_ALL_CORE
30-Jun-2021
25-Jun-2021
30-Jun-2021
25-Jun-2021
25-Jun-2021
30-Jun-2021
25-Jun-2021

## 2021-07-08 NOTE — BH INPATIENT PSYCHIATRY PROGRESS NOTE - NSBHMSEKNOWHOW_PSY_ALL_CORE
Current Events/Vocabulary

## 2021-07-08 NOTE — BH INPATIENT PSYCHIATRY PROGRESS NOTE - NSBHMSEGROOM_PSY_A_CORE
Ongoing SW/CM Assessment/Plan of Care Note     See SW/CM flowsheets for goals and other objective data.    Patient/Family discharge goal (s):  Goal #1: Psychosocial needs assessed  Goal #2: Extended Care Facility discharge arranged      Disposition:  Planned Discharge Destination: Rehabilitation/Skilled Care    Progress note:   Peer Coverage:  SW aware of pt transfer from ICU. Peer alerted this writer of transfer. SW aware that [facility] placement is likely and pt is agreeable. SW will fax referrals to [facility] once PT/OT are able to see. SW will continue to follow.         
Fair
Fair
Good
Good
Fair
Good
Fair
Good

## 2021-07-08 NOTE — BH INPATIENT PSYCHIATRY PROGRESS NOTE - NSBHFUPINTERVALCCFT_PSY_A_CORE
psychosis
Discharge planning.
F/u for hermilo/ psychosis
psychosis
F/u for hermilo/ psychosis
F/u for psychosis
psychosis
F/u for hermilo/ psychosis
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
## 2021-07-08 NOTE — BH INPATIENT PSYCHIATRY PROGRESS NOTE - NSDCCRITERIA_PSY_ALL_CORE
Sx reduction, medication management, safety planning, milieu therapy, outpatient psychiatric care.

## 2021-07-08 NOTE — BH INPATIENT PSYCHIATRY PROGRESS NOTE - NSBHCHARTREVIEWINVESTIGATE_PSY_A_CORE FT
See Chart. QT/ / 474

## 2021-07-08 NOTE — BH INPATIENT PSYCHIATRY PROGRESS NOTE - NSTXPROBMANIC_PSY_ALL_CORE
MANIC SYMPTOMS

## 2021-07-08 NOTE — BH INPATIENT PSYCHIATRY DISCHARGE NOTE - NSDCCPCAREPLAN_GEN_ALL_CORE_FT
PRINCIPAL DISCHARGE DIAGNOSIS  Diagnosis: Bipolar disorder  Assessment and Plan of Treatment:        PRINCIPAL DISCHARGE DIAGNOSIS  Diagnosis: Schizoaffective disorder  Assessment and Plan of Treatment: Outpatient services.

## 2021-07-08 NOTE — BH INPATIENT PSYCHIATRY PROGRESS NOTE - NSTXDCOTHRGOAL_PSY_ALL_CORE
Pt will show a reduction of disorganized behaviors and engage meaningfully with writer to identify a safe discharge plan.

## 2021-07-08 NOTE — BH INPATIENT PSYCHIATRY PROGRESS NOTE - NSCGISEVERILLNESS_PSY_ALL_CORE
4 = Moderately ill – overt symptoms causing noticeable, but modest, functional impairment or distress; symptom level may warrant medication
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
## 2021-07-08 NOTE — BH INPATIENT PSYCHIATRY PROGRESS NOTE - NSTXANXDATEEST_PSY_ALL_CORE
25-Jun-2021
25-Jun-2021
07-Jul-2021
25-Jun-2021
07-Jul-2021
25-Jun-2021
25-Jun-2021

## 2021-07-08 NOTE — BH INPATIENT PSYCHIATRY PROGRESS NOTE - NSTXDIABGOAL_PSY_ALL_CORE
Will be able to able to describe prescribed diabetic medications and how to properly take these medications

## 2021-07-08 NOTE — BH INPATIENT PSYCHIATRY PROGRESS NOTE - NSBHMSETHTPROC_PSY_A_CORE
Disorganized/Circumstantial/Perseverative/Illogical/Impaired reasoning
Disorganized/Circumstantial/Perseverative/Illogical/Impaired reasoning
Linear/Perseverative
Circumstantial/Perseverative/Impaired reasoning
Disorganized/Circumstantial/Perseverative/Illogical/Impaired reasoning
Linear/Perseverative
Linear/Perseverative
Disorganized/Circumstantial/Perseverative/Illogical/Impaired reasoning
Circumstantial/Perseverative/Impaired reasoning
Circumstantial/Perseverative/Impaired reasoning

## 2021-07-08 NOTE — BH INPATIENT PSYCHIATRY PROGRESS NOTE - NSBHCHARTREVIEWVS_PSY_A_CORE FT
Vital Signs Last 24 Hrs  T(C): 36.2 (28 Jun 2021 06:42), Max: 36.4 (27 Jun 2021 22:22)  T(F): 97.2 (28 Jun 2021 06:42), Max: 97.6 (27 Jun 2021 22:22)  HR: --97  BP: --119/77  BP(mean): --  RR: 18 (27 Jun 2021 22:22) (18 - 18)  SpO2: 100% (27 Jun 2021 22:22) (100% - 100%)
Vital Signs Last 24 Hrs  T(C): 36.3 (02 Jul 2021 19:50), Max: 36.6 (02 Jul 2021 14:26)  T(F): 97.3 (02 Jul 2021 19:50), Max: 97.9 (02 Jul 2021 14:26)  HR: 97 (02 Jul 2021 07:59) (97 - 97)  BP: 112/60 (02 Jul 2021 07:59) (112/60 - 112/60)  BP(mean): --  RR: --  SpO2: --
Vital Signs Last 24 Hrs  T(C): 36.3 (26 Jun 2021 06:29), Max: 36.8 (25 Jun 2021 11:30)  T(F): 97.3 (26 Jun 2021 06:29), Max: 98.2 (25 Jun 2021 11:30)  HR: 100 (25 Jun 2021 19:55) (100 - 100)  BP: 139/82 (25 Jun 2021 19:55) (139/82 - 139/82)  BP(mean): --  RR: 17 (25 Jun 2021 11:30) (17 - 17)  SpO2: 98% (25 Jun 2021 11:30) (98% - 98%)
Vital Signs Last 24 Hrs  T(C): 36.6 (30 Jun 2021 14:51), Max: 36.6 (30 Jun 2021 14:51)  T(F): 97.8 (30 Jun 2021 14:51), Max: 97.8 (30 Jun 2021 14:51)  HR: --84  BP: --127/64  BP(mean): --  RR: --  SpO2: --
Vital Signs Last 24 Hrs  T(C): 35.8 (08 Jul 2021 06:42), Max: 36.7 (07 Jul 2021 14:31)  T(F): 96.5 (08 Jul 2021 06:42), Max: 98 (07 Jul 2021 14:31)  HR: --98  BP: --136/76  BP(mean): --  RR: 18 (07 Jul 2021 21:38) (18 - 18)  SpO2: 99% (07 Jul 2021 21:38) (99% - 99%)
Vital Signs Last 24 Hrs  T(C): 36.7 (07 Jul 2021 14:31), Max: 36.8 (06 Jul 2021 19:44)  T(F): 98 (07 Jul 2021 14:31), Max: 98.3 (06 Jul 2021 19:44)  HR: --87  BP: --103/66  BP(mean): --  RR: --  SpO2: --
Vital Signs Last 24 Hrs  T(C): 36.8 (01 Jul 2021 06:38), Max: 36.8 (01 Jul 2021 06:38)  T(F): 98.3 (01 Jul 2021 06:38), Max: 98.3 (01 Jul 2021 06:38)  HR: --  BP: --  BP(mean): --  RR: 16 (30 Jun 2021 20:28) (16 - 16)  SpO2: 99% (30 Jun 2021 20:28) (99% - 99%)
Vital Signs Last 24 Hrs  T(C): 36.4 (27 Jun 2021 06:30), Max: 36.4 (27 Jun 2021 06:30)  T(F): 97.6 (27 Jun 2021 06:30), Max: 97.6 (27 Jun 2021 06:30)  HR: --  BP: --  BP(mean): --  RR: --  SpO2: --
Vital Signs Last 24 Hrs  T(C): 36.2 (06 Jul 2021 06:22), Max: 36.4 (05 Jul 2021 17:46)  T(F): 97.2 (06 Jul 2021 06:22), Max: 97.5 (05 Jul 2021 17:46)  HR: 90 (06 Jul 2021 08:06) (90 - 90)  BP: 119/69 (06 Jul 2021 08:06) (119/69 - 119/69)  BP(mean): --  RR: --  SpO2: --
Vital Signs Last 24 Hrs  T(C): 36.3 (29 Jun 2021 14:38), Max: 36.6 (29 Jun 2021 06:12)  T(F): 97.3 (29 Jun 2021 14:38), Max: 97.8 (29 Jun 2021 06:12)  HR: --98  BP: --121/69  BP(mean): --  RR: --  SpO2: --

## 2021-07-08 NOTE — BH INPATIENT PSYCHIATRY DISCHARGE NOTE - NSBHDCHANDOFFFT_PSY_ALL_CORE
Discharge summary will be faxed to [facility] service league [phone]. Hand-off given to Malia [phone].

## 2021-07-08 NOTE — BH INPATIENT PSYCHIATRY PROGRESS NOTE - NSTXPROBMEDIC_PSY_ALL_CORE
MEDICATION/TREATMENT NON-COMPLIANCE

## 2021-07-08 NOTE — BH INPATIENT PSYCHIATRY PROGRESS NOTE - NSBHMSEKNOW_PSY_A_CORE
Unable to assess
Normal
Normal
Unable to assess
Normal
Normal
Unable to assess
Normal
Unable to assess
Normal

## 2021-07-08 NOTE — BH INPATIENT PSYCHIATRY DISCHARGE NOTE - NSDCMRMEDTOKEN_GEN_ALL_CORE_FT
benztropine 0.5 mg oral tablet: 1 tab(s) orally once a day (at bedtime)  clonazePAM 0.5 mg oral tablet: 1 tab(s) orally 2 times a day MDD:1mg  glipiZIDE 10 mg oral tablet, extended release: 1 tab(s) orally once a day  lisinopril 2.5 mg oral tablet: 1 tab(s) orally once a day  metFORMIN 1000 mg oral tablet: 1 tab(s) orally 2 times a day  pantoprazole 40 mg oral delayed release tablet: 1 tab(s) orally once a day (before a meal)  risperiDONE 4 mg oral tablet: 1 tab(s) orally once a day (at bedtime)

## 2021-07-08 NOTE — BH INPATIENT PSYCHIATRY PROGRESS NOTE - NSTXMEDICDATEEST_PSY_ALL_CORE
26-Jun-2021
30-Jun-2021
03-Jul-2021
30-Jun-2021
26-Jun-2021
26-Jun-2021
30-Jun-2021

## 2021-07-08 NOTE — BH INPATIENT PSYCHIATRY PROGRESS NOTE - NSICDXBHTERTIARYDX_PSY_ALL_CORE
R/O Bipolar disorder   F31.9  

## 2021-07-08 NOTE — BH INPATIENT PSYCHIATRY PROGRESS NOTE - NSTXPROBDCOTHR_PSY_ALL_CORE
DISCHARGE ISSUE - OTHER
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
## 2021-07-08 NOTE — BH INPATIENT PSYCHIATRY PROGRESS NOTE - NSBHMSEAFFCONG_PSY_A_CORE
Non-congruent
Congruent
Congruent
Non-congruent
Congruent

## 2021-07-08 NOTE — BH INPATIENT PSYCHIATRY DISCHARGE NOTE - NSBHDCMEDICALFT_PSY_A_CORE
Diabetes. Diabetes Mellitus- Glipizide 10mg po daily, metformin 100mg po twice a day, Insulin sliding scale.

## 2021-07-08 NOTE — BH INPATIENT PSYCHIATRY PROGRESS NOTE - NSBHFUPINTERVALHXFT_PSY_A_CORE
Patient is followed up for Bipolar Disorder, admitted for psychotic decompensation.  Chart, medications and labs reviewed.  Patient was discussed with nursing staff. No interval events and no PRN medication required for agitation. The patient participated in the treatment team meeting to discuss safety concerns and discharge plans. Her mood is euthymic today. Patient denies SI/ HI intent and plan. She denies perceptual disturbances such as AH, VH, TH. Noted some paranoia still present, though, does not believe her family is conspiring to kill her. Self- care remains adequate. No acute medical concerns. Adherent to medication and denies side effects. Sleep and appetite are adequate. Discharge planning and safety planning discussed.

## 2021-07-08 NOTE — BH INPATIENT PSYCHIATRY PROGRESS NOTE - NSTXMANICDATETRGT_PSY_ALL_CORE
07-Jul-2021
30-Jun-2021
07-Jul-2021
30-Jun-2021
07-Jul-2021
07-Jul-2021

## 2021-07-29 NOTE — SOCIAL WORK POST DISCHARGE FOLLOW UP NOTE - NSBHSWFOLLOWUP_PSY_ALL_CORE_FT
SW attempted to contact pt at [phone] to reschedule appointment but patient did not answer and mailbox is full. At the time of dc the treatment team determined the patient was not a risk to themselves or others. This case is closed.

## 2021-10-09 NOTE — BH PATIENT PROFILE - IS PATIENT PREGNANT?
My Asthma Action Plan  Name: <Patient Name> Yolie SORENSEN Baraga    Date: <Date> 10/9/2021    My Asthma Severity: <Moderate Persistent>  My Peak Flow Number: <550>  Avoid your asthma triggers: <Strong smells , Smoke, Colds/flu>        GO      I feel good    No cough or wheeze    Can work, sleep and play without  asthma symptoms  My peak flow number is  above <440>       Green Zone:  Asthma in good control    Take your asthma control medicine every day: <Medications>  If exercise triggers your asthma, take:   <Medication>, <Dose>  15 minutes before exercise or sports, and  During exercise if you have asthma symptoms  Spacer to use with inhaler: <Holding Chamber>       Slow      I have ANY of these:           I do not feel good    Cough or wheeze    Chest feels tight    Wake up at night   My peak flow number is  between <275> and <440>       Yellow Zone:  Asthma getting worse  Keep taking your Green Zone medications  Start taking your rescue medicine:   <Medication>, <Dose> every 20 minutes for up to 1 hour.  Then every 4 hours for 24-48 hours.  If you do not return to the Green Zone in 12-24 hours, or you get worse, start taking your oral steroid medicine:   <Medication>, <Dose and Duration>  If you stay in the Yellow Zone for more than 12-24 hours, you re your doctor. 1.                     Improved no

## 2022-02-28 PROBLEM — E11.9 TYPE 2 DIABETES MELLITUS WITHOUT COMPLICATIONS: Chronic | Status: ACTIVE | Noted: 2021-06-25

## 2022-03-08 ENCOUNTER — APPOINTMENT (OUTPATIENT)
Dept: OBGYN | Facility: CLINIC | Age: 47
End: 2022-03-08
Payer: COMMERCIAL

## 2022-03-08 DIAGNOSIS — Z01.411 ENCOUNTER FOR GYNECOLOGICAL EXAMINATION (GENERAL) (ROUTINE) WITH ABNORMAL FINDINGS: ICD-10-CM

## 2022-03-08 DIAGNOSIS — N91.1 SECONDARY AMENORRHEA: ICD-10-CM

## 2022-03-08 PROCEDURE — 99072 ADDL SUPL MATRL&STAF TM PHE: CPT

## 2022-03-08 PROCEDURE — 99396 PREV VISIT EST AGE 40-64: CPT

## 2022-03-08 NOTE — PHYSICAL EXAM
[Chaperone Present] : A chaperone was present in the examining room during all aspects of the physical examination [Appropriately responsive] : appropriately responsive [Alert] : alert [No Acute Distress] : no acute distress [No Lymphadenopathy] : no lymphadenopathy [Regular Rate Rhythm] : regular rate rhythm [No Murmurs] : no murmurs [Clear to Auscultation B/L] : clear to auscultation bilaterally [Soft] : soft [Non-tender] : non-tender [Non-distended] : non-distended [No HSM] : No HSM [No Lesions] : no lesions [No Mass] : no mass [Oriented x3] : oriented x3 [Examination Of The Breasts] : a normal appearance [No Masses] : no breast masses were palpable [Labia Majora] : normal [Labia Minora] : normal [Polyp ___ cm] : [unfilled] ~[facility] polyp [Normal] : normal [Anteversion] : anteverted [Uterine Adnexae] : non-palpable [No Tenderness] : no tenderness [Nl Sphincter Tone] : normal sphincter tone [FreeTextEntry1] : hay [Tenderness] : nontender [Enlarged ___ wks] : not enlarged

## 2022-03-08 NOTE — HISTORY OF PRESENT ILLNESS
[FreeTextEntry1] : 46-year-old patient here with complaint of no menstrual cycle for the past 5 months.  No other GYN complaints or issues [TextBox_4] : Patient is here for a yearly exam has not been seen in over 2 years.  States that she was recently hospitalized at [facility] for depression.  States that she has no menstrual cycle for the past 5 months and it has occasional hot flashes.  States that she is also not sexually active.  No intermenstrual bleeding or staining.  No problems with urination or bowel function.  No abdominal pain.  Past medical, surgical and family history reviewed and updated.

## 2022-03-08 NOTE — DISCUSSION/SUMMARY
[FreeTextEntry1] : Assessment is secondary amenorrhea possible premature ovarian failure.  Explained to patient that we will get FSH, LH, TSH, bhcg and prolactin level today.  Explained that the amenorrhea can be due to her medication for depression as well,  also explained that she has an endocervical polyp which could be removed in the office.  Medical assistant Melissa was present during entire examination and discussion

## 2022-03-10 LAB
FSH SERPL-MCNC: 9.6 IU/L
HCG SERPL-MCNC: <1 MIU/ML
HPV HIGH+LOW RISK DNA PNL CVX: NOT DETECTED
LH SERPL-ACNC: 8.8 IU/L
PROLACTIN SERPL-MCNC: 80.9 NG/ML
TSH SERPL-ACNC: 0.5 UIU/ML

## 2022-03-14 LAB — CYTOLOGY CVX/VAG DOC THIN PREP: NORMAL

## 2022-03-18 NOTE — PROGRESS NOTE BEHAVIORAL HEALTH - BODY HABITUS
Well nourished
Negative

## 2022-08-08 NOTE — PROGRESS NOTE BEHAVIORAL HEALTH - JUDGMENT (REGARDING EVERYDAY EVENTS)
Poor Burow's Graft Text: The defect edges were debeveled with a #15 scalpel blade.  Given the location of the defect, shape of the defect, the proximity to free margins and the presence of a standing cone deformity a Burow's skin graft was deemed most appropriate. The standing cone was removed and this tissue was then trimmed to the shape of the primary defect. The adipose tissue was also removed until only dermis and epidermis were left.  The skin margins of the secondary defect were undermined to an appropriate distance in all directions utilizing iris scissors.  The secondary defect was closed with interrupted buried subcutaneous sutures.  The skin edges were then re-apposed with running  sutures.  The skin graft was then placed in the primary defect and oriented appropriately.

## 2022-10-11 DIAGNOSIS — Z87.09 PERSONAL HISTORY OF OTHER DISEASES OF THE RESPIRATORY SYSTEM: ICD-10-CM

## 2022-10-12 ENCOUNTER — NON-APPOINTMENT (OUTPATIENT)
Age: 47
End: 2022-10-12

## 2022-10-12 ENCOUNTER — APPOINTMENT (OUTPATIENT)
Dept: CARDIOLOGY | Facility: CLINIC | Age: 47
End: 2022-10-12

## 2022-10-12 VITALS
OXYGEN SATURATION: 96 % | HEIGHT: 62 IN | HEART RATE: 100 BPM | WEIGHT: 205 LBS | BODY MASS INDEX: 37.73 KG/M2 | DIASTOLIC BLOOD PRESSURE: 66 MMHG | SYSTOLIC BLOOD PRESSURE: 112 MMHG

## 2022-10-12 DIAGNOSIS — Z78.9 OTHER SPECIFIED HEALTH STATUS: ICD-10-CM

## 2022-10-12 DIAGNOSIS — E78.00 PURE HYPERCHOLESTEROLEMIA, UNSPECIFIED: ICD-10-CM

## 2022-10-12 DIAGNOSIS — Z13.6 ENCOUNTER FOR SCREENING FOR CARDIOVASCULAR DISORDERS: ICD-10-CM

## 2022-10-12 DIAGNOSIS — R01.1 CARDIAC MURMUR, UNSPECIFIED: ICD-10-CM

## 2022-10-12 DIAGNOSIS — F20.0 PARANOID SCHIZOPHRENIA: ICD-10-CM

## 2022-10-12 DIAGNOSIS — E11.9 TYPE 2 DIABETES MELLITUS W/OUT COMPLICATIONS: ICD-10-CM

## 2022-10-12 PROCEDURE — 93000 ELECTROCARDIOGRAM COMPLETE: CPT

## 2022-10-12 PROCEDURE — 99204 OFFICE O/P NEW MOD 45 MIN: CPT | Mod: 25

## 2022-10-12 RX ORDER — CLOZAPINE 100 MG/1
100 TABLET ORAL DAILY
Refills: 0 | Status: ACTIVE | COMMUNITY

## 2022-10-12 RX ORDER — NORGESTIMATE AND ETHINYL ESTRADIOL 7DAYSX3 28
0.18/0.215/0.25 KIT ORAL DAILY
Qty: 3 | Refills: 1 | Status: DISCONTINUED | COMMUNITY
Start: 2019-10-03 | End: 2022-10-12

## 2022-10-12 RX ORDER — CANAGLIFLOZIN 300 MG/1
300 TABLET, FILM COATED ORAL DAILY
Refills: 0 | Status: ACTIVE | COMMUNITY

## 2022-10-12 RX ORDER — GLIPIZIDE 10 MG/1
10 TABLET ORAL DAILY
Refills: 0 | Status: ACTIVE | COMMUNITY

## 2022-10-12 RX ORDER — BENZTROPINE MESYLATE 0.5 MG/1
0.5 TABLET ORAL TWICE DAILY
Refills: 0 | Status: ACTIVE | COMMUNITY

## 2022-10-12 RX ORDER — PRAVASTATIN SODIUM 40 MG/1
40 TABLET ORAL
Qty: 90 | Refills: 3 | Status: ACTIVE | COMMUNITY

## 2022-10-12 RX ORDER — METFORMIN HYDROCHLORIDE 1000 MG/1
1000 TABLET, COATED ORAL DAILY
Refills: 0 | Status: ACTIVE | COMMUNITY

## 2022-10-12 RX ORDER — DULAGLUTIDE 4.5 MG/.5ML
4.5 INJECTION, SOLUTION SUBCUTANEOUS
Refills: 0 | Status: ACTIVE | COMMUNITY

## 2022-10-12 NOTE — PHYSICAL EXAM
[Normal] : moves all extremities, no focal deficits, normal speech [de-identified] : No carotid bruits auscultated bilaterally [de-identified] : regular tachycardia, 2/6 systolic ejection murmur RUSB.

## 2022-10-12 NOTE — DISCUSSION/SUMMARY
[FreeTextEntry1] : 1. Systolic Murmur: recommend echocardiogram to evaluate the AV.\par \par 2. HLD: reviewed labs from July 2022. LDL was below 100 at 90. Continue pravastatin 40mg daily.\par \par Office will call with results.\par \par Follow up in 1 year. [EKG obtained to assist in diagnosis and management of assessed problem(s)] : EKG obtained to assist in diagnosis and management of assessed problem(s)

## 2022-10-12 NOTE — HISTORY OF PRESENT ILLNESS
[FreeTextEntry1] : 46 year old female with PMHx of HLD, diabetes, schizophrenia, presents to establish care with a cardiologist. Patient has no chest pain, dyspnea, or palptations.\par \par There is no history of MI, CVA, CHF, or previous coronary intervention.\par

## 2022-11-11 ENCOUNTER — APPOINTMENT (OUTPATIENT)
Dept: CARDIOLOGY | Facility: CLINIC | Age: 47
End: 2022-11-11

## 2022-11-11 PROCEDURE — 93306 TTE W/DOPPLER COMPLETE: CPT

## 2023-05-05 ENCOUNTER — APPOINTMENT (OUTPATIENT)
Dept: OPHTHALMOLOGY | Facility: CLINIC | Age: 48
End: 2023-05-05

## 2023-10-19 ENCOUNTER — APPOINTMENT (OUTPATIENT)
Dept: CARDIOLOGY | Facility: CLINIC | Age: 48
End: 2023-10-19
